# Patient Record
Sex: MALE | Race: WHITE | Employment: FULL TIME | URBAN - METROPOLITAN AREA
[De-identification: names, ages, dates, MRNs, and addresses within clinical notes are randomized per-mention and may not be internally consistent; named-entity substitution may affect disease eponyms.]

---

## 2019-08-20 ENCOUNTER — HOSPITAL ENCOUNTER (EMERGENCY)
Age: 39
Discharge: HOME | End: 2019-08-21
Attending: EMERGENCY MEDICINE
Payer: COMMERCIAL

## 2019-08-20 VITALS
HEART RATE: 86 BPM | RESPIRATION RATE: 18 BRPM | SYSTOLIC BLOOD PRESSURE: 126 MMHG | HEIGHT: 71 IN | DIASTOLIC BLOOD PRESSURE: 74 MMHG | WEIGHT: 228 LBS | BODY MASS INDEX: 31.92 KG/M2 | TEMPERATURE: 97.8 F | OXYGEN SATURATION: 100 %

## 2019-08-20 DIAGNOSIS — Z77.098 CHEMICAL EXPOSURE OF EYE: Primary | ICD-10-CM

## 2019-08-20 PROCEDURE — 99282 EMERGENCY DEPT VISIT SF MDM: CPT

## 2019-08-20 ASSESSMENT — VISUAL ACUITY
OD: 20/25
OU: 20/10
OS: 20/10

## 2020-08-21 ENCOUNTER — OFFICE VISIT (OUTPATIENT)
Dept: SURGERY | Age: 40
End: 2020-08-21
Payer: COMMERCIAL

## 2020-08-21 VITALS
HEART RATE: 77 BPM | BODY MASS INDEX: 32.34 KG/M2 | WEIGHT: 231 LBS | TEMPERATURE: 97.3 F | DIASTOLIC BLOOD PRESSURE: 75 MMHG | SYSTOLIC BLOOD PRESSURE: 118 MMHG | HEIGHT: 71 IN

## 2020-08-21 PROBLEM — K43.2 INCISIONAL HERNIA, WITHOUT OBSTRUCTION OR GANGRENE: Status: ACTIVE | Noted: 2020-08-21

## 2020-08-21 PROBLEM — K40.90 NON-RECURRENT UNILATERAL INGUINAL HERNIA WITHOUT OBSTRUCTION OR GANGRENE: Status: ACTIVE | Noted: 2020-08-21

## 2020-08-21 PROCEDURE — 99243 OFF/OP CNSLTJ NEW/EST LOW 30: CPT | Performed by: SURGERY

## 2020-08-21 RX ORDER — SODIUM CHLORIDE 0.9 % (FLUSH) 0.9 %
10 SYRINGE (ML) INJECTION PRN
Status: CANCELLED | OUTPATIENT
Start: 2020-08-21

## 2020-08-21 RX ORDER — SODIUM CHLORIDE 0.9 % (FLUSH) 0.9 %
10 SYRINGE (ML) INJECTION EVERY 12 HOURS SCHEDULED
Status: CANCELLED | OUTPATIENT
Start: 2020-08-21

## 2020-08-21 NOTE — PROGRESS NOTES
New Patient [address] [place] [address] Surgery Alfonso BHATIA [place], [address] [facility], [address] [facility], [address] [facility]  Phone: [phone]  Fax: [phone]    Ovi Kern   YOB: 1980    Date of Visit:  8/21/2020    No ref. provider found  Swetha Anderson    HPI:   Inguinal Hernia: Patient is 47y.o. year old male seen at request of Swetha Anderson. Patient presents for evaluation of right right inguinal hernia. Symptoms were first noted several years ago. Pain is dull, intermittent. Lump is reducible. Pt has no symptoms of  chronic constipation, chronic cough, difficulty urinating. Pt. does not have previous history of prior  Inguinal bilateral hernia surgery. Pt has also noted more recently a vague bulge in his upper epigastrium centered on a trocar incision from prior cholecystectomy. No pain. Slowly enlarging. No Known Allergies  No outpatient medications have been marked as taking for the 8/21/20 encounter (Office Visit) with Susan Eagle MD.       History reviewed. No pertinent past medical history. Past Surgical History:   Procedure Laterality Date    GALLBLADDER SURGERY      TONSILLECTOMY       History reviewed. No pertinent family history.   Social History     Socioeconomic History    Marital status:      Spouse name: Not on file    Number of children: Not on file    Years of education: Not on file    Highest education level: Not on file   Occupational History    Not on file   Social Needs    Financial resource strain: Not on file    Food insecurity     Worry: Not on file     Inability: Not on file    Transportation needs     Medical: Not on file     Non-medical: Not on file   Tobacco Use    Smoking status: Never Smoker    Smokeless tobacco: Never Used   Substance and Sexual Activity    Alcohol use: Yes     Comment: occ     Drug use: Never    Sexual activity: Not on file   Lifestyle    Physical activity     Days per time      DATA:      ASSESSMENT:     Diagnosis Orders   1. Non-recurrent unilateral inguinal hernia without obstruction or gangrene     2. Incisional hernia, without obstruction or gangrene           PLAN:    We will schedule the pt for  right inguinal hernia repair via a robotic approach. I will also repair the trocar site hernia - I may use the site as a trocar site for the inguinal hernia repair, and then repair the epigastric defect at the end, also with mesh. The technical aspects, risks, benefits and complications of the procedure were discussed with the patient. The pt appears to understand, asks appropriate questions, and agrees to proceed with the procedure.        EVGENY [employer] FABIAN

## 2020-08-21 NOTE — PATIENT INSTRUCTIONS
[address] [address]  Phone: [phone]  Fax: [phone] will be scheduled for surgery with Dr. Ravin Franklin. · The office will call you with the date and time that surgery is scheduled. · Please take note of these instructions for surgery:  · You should have nothing by mouth after midnight the night before your surgery - this includes no food or water. · Your surgery will be cancelled if you have taken anything by mouth after midnight, NO exceptions. · You will need to have a history and physical prior to your surgery. This will need to be completed up to 30 days before your surgery. This H/P can be completed by your family doctor or the hospital.   · IF you take coumadin (warfarin), please stop taking this medication 5 days prior to your surgery. · IF you take plavix, please stop taking this 7 days prior to your surgery. · Please contact our office if you have a pacemaker or defibrillator. · IF you are allergic to latex, please tell our office prior to your surgery. This is important in know before scheduling your surgery. · IF you are having an out patient surgery, you will need someone available to drive you home after your surgery, and to also stay with you for the rest of the day. · IF you are having a surgery requiring an inpatient stay in the hospital, you will need someone to drive you home upon discharge from the hospital.  · Please contact Dr. Dillard  Tameka Andino if you have any questions or concerns. · Please call the office with any changes in your symptoms or further questions/concerns.  [phone]

## 2020-08-25 ENCOUNTER — TELEPHONE (OUTPATIENT)
Dept: GASTROENTEROLOGY | Age: 40
End: 2020-08-25

## 2020-08-26 NOTE — TELEPHONE ENCOUNTER
Called and spoke w/ pt - Scheduled for a Robotic Right Inguinal Hernia Repair with Mesh & Epigastric Hernia Repair with Mesh, Possible Open Procedures (General Anes) w/ Dr Fallon Lopez on 9/16/20 @ 8:30am arrival 6:30am [facility] - NPO after midnight - Pt scheduled to have COVID test @ the [facility] on 9/10/20 - Salvador Cole will complete pre-op physical (H&P form given to pt @ [facility] on 8/21/20) - Surgery instructions emailed to pt: [email or web address]

## 2020-09-10 ENCOUNTER — OFFICE VISIT (OUTPATIENT)
Dept: PRIMARY CARE CLINIC | Age: 40
End: 2020-09-10
Payer: COMMERCIAL

## 2020-09-10 PROCEDURE — 99211 OFF/OP EST MAY X REQ PHY/QHP: CPT | Performed by: NURSE PRACTITIONER

## 2020-09-10 NOTE — PATIENT INSTRUCTIONS

## 2020-09-10 NOTE — PROGRESS NOTES
Ovi Kern received a viral test for COVID-19. They were educated on isolation and quarantine as appropriate. For any symptoms, they were directed to seek care from their PCP, given contact information to establish with a doctor, directed to an urgent care or the emergency room.

## 2020-09-11 NOTE — PROGRESS NOTES
Obstructive Sleep Apnea (SANDEEP) Screening     Patient: Papi Medina    YOB: 1980      Medical Record #:  [id number]                     Date:  9/11/2020     1. Are you a loud and/or regular snorer? []  Yes       [x] No    2. Have you been observed to gasp or stop breathing during sleep? []  Yes       [x] No    3. Do you feel tired or groggy upon awakening or do you awaken with a headache?           []  Yes       [] No    4. Are you often tired or fatigued during the wake time hours? []  Yes       [] No    5. Do you fall asleep sitting, reading, watching TV or driving? []  Yes       [] No    6. Do you often have problems with memory or concentration? []  Yes       [] No    **If patient's score is ? 3 they are considered high risk for SANDEEP. An Anesthesia provider will evaluate the patient and develop a plan of care the day of surgery. Note:  If the patient's BMI is more than 35 kg m¯² , has neck circumference > 40 cm, and/or high blood pressure the risk is greater (© American Sleep Apnea Association, 2006).

## 2020-09-12 LAB — SARS-COV-2, NAA: NOT DETECTED

## 2020-09-15 ENCOUNTER — ANESTHESIA EVENT (OUTPATIENT)
Dept: OPERATING ROOM | Age: 40
End: 2020-09-15
Payer: COMMERCIAL

## 2020-09-16 ENCOUNTER — ANESTHESIA (OUTPATIENT)
Dept: OPERATING ROOM | Age: 40
End: 2020-09-16
Payer: COMMERCIAL

## 2020-09-16 ENCOUNTER — HOSPITAL ENCOUNTER (OUTPATIENT)
Age: 40
Setting detail: OUTPATIENT SURGERY
Discharge: HOME | End: 2020-09-16
Attending: SURGERY | Admitting: SURGERY
Payer: COMMERCIAL

## 2020-09-16 VITALS
HEART RATE: 86 BPM | RESPIRATION RATE: 14 BRPM | TEMPERATURE: 97.2 F | DIASTOLIC BLOOD PRESSURE: 70 MMHG | OXYGEN SATURATION: 98 % | HEIGHT: 71 IN | BODY MASS INDEX: 32.2 KG/M2 | SYSTOLIC BLOOD PRESSURE: 121 MMHG | WEIGHT: 230 LBS

## 2020-09-16 VITALS — OXYGEN SATURATION: 100 % | SYSTOLIC BLOOD PRESSURE: 108 MMHG | DIASTOLIC BLOOD PRESSURE: 71 MMHG

## 2020-09-16 PROCEDURE — 3600000019 HC SURGERY ROBOT ADDTL 15MIN: Performed by: SURGERY

## 2020-09-16 PROCEDURE — 6360000002 HC RX W HCPCS: Performed by: SURGERY

## 2020-09-16 PROCEDURE — 2580000003 HC RX 258: Performed by: SURGERY

## 2020-09-16 PROCEDURE — 7100000010 HC PHASE II RECOVERY - FIRST 15 MIN: Performed by: SURGERY

## 2020-09-16 PROCEDURE — 3700000000 HC ANESTHESIA ATTENDED CARE: Performed by: SURGERY

## 2020-09-16 PROCEDURE — 2500000003 HC RX 250 WO HCPCS: Performed by: NURSE ANESTHETIST, CERTIFIED REGISTERED

## 2020-09-16 PROCEDURE — 7100000000 HC PACU RECOVERY - FIRST 15 MIN: Performed by: SURGERY

## 2020-09-16 PROCEDURE — 3700000001 HC ADD 15 MINUTES (ANESTHESIA): Performed by: SURGERY

## 2020-09-16 PROCEDURE — 2580000003 HC RX 258: Performed by: NURSE ANESTHETIST, CERTIFIED REGISTERED

## 2020-09-16 PROCEDURE — 49650 LAP ING HERNIA REPAIR INIT: CPT | Performed by: SURGERY

## 2020-09-16 PROCEDURE — 6360000002 HC RX W HCPCS: Performed by: NURSE ANESTHETIST, CERTIFIED REGISTERED

## 2020-09-16 PROCEDURE — 2580000003 HC RX 258: Performed by: ANESTHESIOLOGY

## 2020-09-16 PROCEDURE — 2500000003 HC RX 250 WO HCPCS: Performed by: SURGERY

## 2020-09-16 PROCEDURE — C1781 MESH (IMPLANTABLE): HCPCS | Performed by: SURGERY

## 2020-09-16 PROCEDURE — 3600000009 HC SURGERY ROBOT BASE: Performed by: SURGERY

## 2020-09-16 PROCEDURE — 6360000002 HC RX W HCPCS: Performed by: ANESTHESIOLOGY

## 2020-09-16 PROCEDURE — 7100000001 HC PACU RECOVERY - ADDTL 15 MIN: Performed by: SURGERY

## 2020-09-16 PROCEDURE — S2900 ROBOTIC SURGICAL SYSTEM: HCPCS | Performed by: SURGERY

## 2020-09-16 PROCEDURE — 2709999900 HC NON-CHARGEABLE SUPPLY: Performed by: SURGERY

## 2020-09-16 PROCEDURE — C9290 INJ, BUPIVACAINE LIPOSOME: HCPCS | Performed by: SURGERY

## 2020-09-16 PROCEDURE — 49655 PR LAP, INCISIONAL HERNIA REPAIR,INCARCERATED: CPT | Performed by: SURGERY

## 2020-09-16 PROCEDURE — 7100000011 HC PHASE II RECOVERY - ADDTL 15 MIN: Performed by: SURGERY

## 2020-09-16 DEVICE — MESH HERN W16XL12CM LAP MFIL POLY TEREPHTHALATE MFIL: Type: IMPLANTABLE DEVICE | Site: GROIN | Status: FUNCTIONAL

## 2020-09-16 DEVICE — MESH HERN W10XL15CM POLYPR FLAT L PORE MFIL SFT KNIT LTWT: Type: IMPLANTABLE DEVICE | Site: ABDOMEN | Status: FUNCTIONAL

## 2020-09-16 RX ORDER — SODIUM CHLORIDE, SODIUM LACTATE, POTASSIUM CHLORIDE, CALCIUM CHLORIDE 600; 310; 30; 20 MG/100ML; MG/100ML; MG/100ML; MG/100ML
INJECTION, SOLUTION INTRAVENOUS CONTINUOUS PRN
Status: DISCONTINUED | OUTPATIENT
Start: 2020-09-16 | End: 2020-09-16 | Stop reason: SDUPTHER

## 2020-09-16 RX ORDER — FENTANYL CITRATE 50 UG/ML
INJECTION, SOLUTION INTRAMUSCULAR; INTRAVENOUS PRN
Status: DISCONTINUED | OUTPATIENT
Start: 2020-09-16 | End: 2020-09-16 | Stop reason: SDUPTHER

## 2020-09-16 RX ORDER — SODIUM CHLORIDE, SODIUM LACTATE, POTASSIUM CHLORIDE, CALCIUM CHLORIDE 600; 310; 30; 20 MG/100ML; MG/100ML; MG/100ML; MG/100ML
INJECTION, SOLUTION INTRAVENOUS CONTINUOUS
Status: DISCONTINUED | OUTPATIENT
Start: 2020-09-16 | End: 2020-09-16 | Stop reason: HOSPADM

## 2020-09-16 RX ORDER — MEPERIDINE HYDROCHLORIDE 50 MG/ML
12.5 INJECTION INTRAMUSCULAR; INTRAVENOUS; SUBCUTANEOUS EVERY 5 MIN PRN
Status: DISCONTINUED | OUTPATIENT
Start: 2020-09-16 | End: 2020-09-16 | Stop reason: HOSPADM

## 2020-09-16 RX ORDER — OXYCODONE HYDROCHLORIDE AND ACETAMINOPHEN 5; 325 MG/1; MG/1
1 TABLET ORAL EVERY 4 HOURS PRN
Qty: 15 TABLET | Refills: 0 | Status: SHIPPED | OUTPATIENT
Start: 2020-09-16 | End: 2020-09-19

## 2020-09-16 RX ORDER — ONDANSETRON 2 MG/ML
INJECTION INTRAMUSCULAR; INTRAVENOUS PRN
Status: DISCONTINUED | OUTPATIENT
Start: 2020-09-16 | End: 2020-09-16 | Stop reason: SDUPTHER

## 2020-09-16 RX ORDER — OXYCODONE HYDROCHLORIDE AND ACETAMINOPHEN 5; 325 MG/1; MG/1
1 TABLET ORAL PRN
Status: DISCONTINUED | OUTPATIENT
Start: 2020-09-16 | End: 2020-09-16 | Stop reason: HOSPADM

## 2020-09-16 RX ORDER — LIDOCAINE HYDROCHLORIDE 10 MG/ML
1 INJECTION, SOLUTION EPIDURAL; INFILTRATION; INTRACAUDAL; PERINEURAL
Status: DISCONTINUED | OUTPATIENT
Start: 2020-09-16 | End: 2020-09-16 | Stop reason: HOSPADM

## 2020-09-16 RX ORDER — BUPIVACAINE HYDROCHLORIDE 5 MG/ML
INJECTION, SOLUTION EPIDURAL; INTRACAUDAL PRN
Status: DISCONTINUED | OUTPATIENT
Start: 2020-09-16 | End: 2020-09-16 | Stop reason: ALTCHOICE

## 2020-09-16 RX ORDER — LIDOCAINE HYDROCHLORIDE 20 MG/ML
INJECTION, SOLUTION INFILTRATION; PERINEURAL PRN
Status: DISCONTINUED | OUTPATIENT
Start: 2020-09-16 | End: 2020-09-16 | Stop reason: SDUPTHER

## 2020-09-16 RX ORDER — SODIUM CHLORIDE 0.9 % (FLUSH) 0.9 %
10 SYRINGE (ML) INJECTION PRN
Status: DISCONTINUED | OUTPATIENT
Start: 2020-09-16 | End: 2020-09-16 | Stop reason: HOSPADM

## 2020-09-16 RX ORDER — ONDANSETRON 2 MG/ML
4 INJECTION INTRAMUSCULAR; INTRAVENOUS EVERY 30 MIN PRN
Status: DISCONTINUED | OUTPATIENT
Start: 2020-09-16 | End: 2020-09-16 | Stop reason: HOSPADM

## 2020-09-16 RX ORDER — PHENYLEPHRINE HCL IN 0.9% NACL 1 MG/10 ML
SYRINGE (ML) INTRAVENOUS PRN
Status: DISCONTINUED | OUTPATIENT
Start: 2020-09-16 | End: 2020-09-16 | Stop reason: SDUPTHER

## 2020-09-16 RX ORDER — DEXAMETHASONE SODIUM PHOSPHATE 4 MG/ML
INJECTION, SOLUTION INTRA-ARTICULAR; INTRALESIONAL; INTRAMUSCULAR; INTRAVENOUS; SOFT TISSUE PRN
Status: DISCONTINUED | OUTPATIENT
Start: 2020-09-16 | End: 2020-09-16 | Stop reason: SDUPTHER

## 2020-09-16 RX ORDER — SODIUM CHLORIDE 0.9 % (FLUSH) 0.9 %
10 SYRINGE (ML) INJECTION EVERY 12 HOURS SCHEDULED
Status: DISCONTINUED | OUTPATIENT
Start: 2020-09-16 | End: 2020-09-16 | Stop reason: HOSPADM

## 2020-09-16 RX ORDER — PROPOFOL 10 MG/ML
INJECTION, EMULSION INTRAVENOUS PRN
Status: DISCONTINUED | OUTPATIENT
Start: 2020-09-16 | End: 2020-09-16 | Stop reason: SDUPTHER

## 2020-09-16 RX ORDER — DIPHENHYDRAMINE HYDROCHLORIDE 50 MG/ML
6.25 INJECTION INTRAMUSCULAR; INTRAVENOUS
Status: DISCONTINUED | OUTPATIENT
Start: 2020-09-16 | End: 2020-09-16 | Stop reason: HOSPADM

## 2020-09-16 RX ORDER — GLYCOPYRROLATE 0.2 MG/ML
INJECTION INTRAMUSCULAR; INTRAVENOUS PRN
Status: DISCONTINUED | OUTPATIENT
Start: 2020-09-16 | End: 2020-09-16 | Stop reason: SDUPTHER

## 2020-09-16 RX ORDER — HYDRALAZINE HYDROCHLORIDE 20 MG/ML
5 INJECTION INTRAMUSCULAR; INTRAVENOUS EVERY 30 MIN PRN
Status: DISCONTINUED | OUTPATIENT
Start: 2020-09-16 | End: 2020-09-16 | Stop reason: HOSPADM

## 2020-09-16 RX ORDER — MIDAZOLAM HYDROCHLORIDE 1 MG/ML
INJECTION INTRAMUSCULAR; INTRAVENOUS PRN
Status: DISCONTINUED | OUTPATIENT
Start: 2020-09-16 | End: 2020-09-16 | Stop reason: SDUPTHER

## 2020-09-16 RX ORDER — LABETALOL HYDROCHLORIDE 5 MG/ML
5 INJECTION, SOLUTION INTRAVENOUS
Status: DISCONTINUED | OUTPATIENT
Start: 2020-09-16 | End: 2020-09-16 | Stop reason: HOSPADM

## 2020-09-16 RX ORDER — OXYCODONE HYDROCHLORIDE AND ACETAMINOPHEN 5; 325 MG/1; MG/1
2 TABLET ORAL PRN
Status: DISCONTINUED | OUTPATIENT
Start: 2020-09-16 | End: 2020-09-16 | Stop reason: HOSPADM

## 2020-09-16 RX ORDER — ROCURONIUM BROMIDE 10 MG/ML
INJECTION, SOLUTION INTRAVENOUS PRN
Status: DISCONTINUED | OUTPATIENT
Start: 2020-09-16 | End: 2020-09-16 | Stop reason: SDUPTHER

## 2020-09-16 RX ORDER — SUCCINYLCHOLINE CHLORIDE 20 MG/ML
INJECTION INTRAMUSCULAR; INTRAVENOUS PRN
Status: DISCONTINUED | OUTPATIENT
Start: 2020-09-16 | End: 2020-09-16 | Stop reason: SDUPTHER

## 2020-09-16 RX ADMIN — MIDAZOLAM HYDROCHLORIDE 2 MG: 2 INJECTION, SOLUTION INTRAMUSCULAR; INTRAVENOUS at 08:29

## 2020-09-16 RX ADMIN — PROPOFOL 200 MG: 10 INJECTION, EMULSION INTRAVENOUS at 08:37

## 2020-09-16 RX ADMIN — FENTANYL CITRATE 50 MCG: 50 INJECTION, SOLUTION INTRAMUSCULAR; INTRAVENOUS at 11:29

## 2020-09-16 RX ADMIN — ROCURONIUM BROMIDE 10 MG: 10 SOLUTION INTRAVENOUS at 09:39

## 2020-09-16 RX ADMIN — LIDOCAINE HYDROCHLORIDE 100 MG: 20 INJECTION, SOLUTION INFILTRATION; PERINEURAL at 08:37

## 2020-09-16 RX ADMIN — HYDROMORPHONE HYDROCHLORIDE 0.5 MG: 1 INJECTION, SOLUTION INTRAMUSCULAR; INTRAVENOUS; SUBCUTANEOUS at 11:37

## 2020-09-16 RX ADMIN — SODIUM CHLORIDE, SODIUM LACTATE, POTASSIUM CHLORIDE, AND CALCIUM CHLORIDE: .6; .31; .03; .02 INJECTION, SOLUTION INTRAVENOUS at 06:57

## 2020-09-16 RX ADMIN — ONDANSETRON 4 MG: 2 INJECTION INTRAMUSCULAR; INTRAVENOUS at 08:37

## 2020-09-16 RX ADMIN — SUCCINYLCHOLINE CHLORIDE 120 MG: 20 INJECTION, SOLUTION INTRAMUSCULAR; INTRAVENOUS at 08:37

## 2020-09-16 RX ADMIN — Medication 100 MCG: at 09:07

## 2020-09-16 RX ADMIN — SUGAMMADEX 209 MG: 100 INJECTION, SOLUTION INTRAVENOUS at 11:17

## 2020-09-16 RX ADMIN — ROCURONIUM BROMIDE 45 MG: 10 SOLUTION INTRAVENOUS at 08:42

## 2020-09-16 RX ADMIN — FENTANYL CITRATE 50 MCG: 50 INJECTION, SOLUTION INTRAMUSCULAR; INTRAVENOUS at 11:16

## 2020-09-16 RX ADMIN — ROCURONIUM BROMIDE 20 MG: 10 SOLUTION INTRAVENOUS at 10:04

## 2020-09-16 RX ADMIN — FENTANYL CITRATE 150 MCG: 50 INJECTION, SOLUTION INTRAMUSCULAR; INTRAVENOUS at 08:37

## 2020-09-16 RX ADMIN — CEFAZOLIN SODIUM 2 G: 10 INJECTION, POWDER, FOR SOLUTION INTRAVENOUS at 08:43

## 2020-09-16 RX ADMIN — DEXAMETHASONE SODIUM PHOSPHATE 8 MG: 4 INJECTION, SOLUTION INTRAMUSCULAR; INTRAVENOUS at 08:37

## 2020-09-16 RX ADMIN — ROCURONIUM BROMIDE 20 MG: 10 SOLUTION INTRAVENOUS at 10:44

## 2020-09-16 RX ADMIN — GLYCOPYRROLATE 0.2 MG: 0.2 INJECTION, SOLUTION INTRAMUSCULAR; INTRAVENOUS at 09:05

## 2020-09-16 RX ADMIN — ROCURONIUM BROMIDE 5 MG: 10 SOLUTION INTRAVENOUS at 08:37

## 2020-09-16 RX ADMIN — ROCURONIUM BROMIDE 20 MG: 10 SOLUTION INTRAVENOUS at 08:55

## 2020-09-16 RX ADMIN — SODIUM CHLORIDE, SODIUM LACTATE, POTASSIUM CHLORIDE, AND CALCIUM CHLORIDE: .6; .31; .03; .02 INJECTION, SOLUTION INTRAVENOUS at 08:46

## 2020-09-16 ASSESSMENT — PULMONARY FUNCTION TESTS
PIF_VALUE: 28
PIF_VALUE: 27
PIF_VALUE: 20
PIF_VALUE: 33
PIF_VALUE: 30
PIF_VALUE: 15
PIF_VALUE: 28
PIF_VALUE: 8
PIF_VALUE: 11
PIF_VALUE: 28
PIF_VALUE: 32
PIF_VALUE: 28
PIF_VALUE: 22
PIF_VALUE: 33
PIF_VALUE: 28
PIF_VALUE: 14
PIF_VALUE: 28
PIF_VALUE: 18
PIF_VALUE: 28
PIF_VALUE: 32
PIF_VALUE: 22
PIF_VALUE: 33
PIF_VALUE: 8
PIF_VALUE: 33
PIF_VALUE: 1
PIF_VALUE: 30
PIF_VALUE: 28
PIF_VALUE: 22
PIF_VALUE: 28
PIF_VALUE: 26
PIF_VALUE: 28
PIF_VALUE: 28
PIF_VALUE: 22
PIF_VALUE: 28
PIF_VALUE: 10
PIF_VALUE: 33
PIF_VALUE: 28
PIF_VALUE: 32
PIF_VALUE: 15
PIF_VALUE: 29
PIF_VALUE: 27
PIF_VALUE: 1
PIF_VALUE: 27
PIF_VALUE: 28
PIF_VALUE: 28
PIF_VALUE: 18
PIF_VALUE: 15
PIF_VALUE: 29
PIF_VALUE: 28
PIF_VALUE: 15
PIF_VALUE: 32
PIF_VALUE: 28
PIF_VALUE: 28
PIF_VALUE: 22
PIF_VALUE: 29
PIF_VALUE: 22
PIF_VALUE: 28
PIF_VALUE: 28
PIF_VALUE: 1
PIF_VALUE: 28
PIF_VALUE: 14
PIF_VALUE: 28
PIF_VALUE: 33
PIF_VALUE: 28
PIF_VALUE: 28
PIF_VALUE: 15
PIF_VALUE: 19
PIF_VALUE: 15
PIF_VALUE: 14
PIF_VALUE: 27
PIF_VALUE: 1
PIF_VALUE: 28
PIF_VALUE: 28
PIF_VALUE: 22
PIF_VALUE: 28
PIF_VALUE: 1
PIF_VALUE: 7
PIF_VALUE: 28
PIF_VALUE: 15
PIF_VALUE: 28
PIF_VALUE: 28
PIF_VALUE: 6
PIF_VALUE: 33
PIF_VALUE: 28
PIF_VALUE: 28
PIF_VALUE: 9
PIF_VALUE: 1
PIF_VALUE: 20
PIF_VALUE: 33
PIF_VALUE: 28
PIF_VALUE: 31
PIF_VALUE: 28
PIF_VALUE: 9
PIF_VALUE: 28
PIF_VALUE: 28
PIF_VALUE: 30
PIF_VALUE: 22
PIF_VALUE: 2
PIF_VALUE: 28
PIF_VALUE: 22
PIF_VALUE: 16
PIF_VALUE: 28
PIF_VALUE: 33
PIF_VALUE: 2
PIF_VALUE: 33
PIF_VALUE: 33
PIF_VALUE: 28
PIF_VALUE: 29
PIF_VALUE: 28
PIF_VALUE: 28
PIF_VALUE: 29
PIF_VALUE: 1
PIF_VALUE: 14
PIF_VALUE: 15
PIF_VALUE: 28
PIF_VALUE: 28
PIF_VALUE: 31
PIF_VALUE: 32
PIF_VALUE: 28
PIF_VALUE: 32
PIF_VALUE: 28
PIF_VALUE: 16
PIF_VALUE: 33
PIF_VALUE: 28
PIF_VALUE: 29
PIF_VALUE: 28
PIF_VALUE: 31
PIF_VALUE: 28
PIF_VALUE: 33
PIF_VALUE: 28
PIF_VALUE: 28
PIF_VALUE: 15
PIF_VALUE: 28
PIF_VALUE: 28
PIF_VALUE: 33
PIF_VALUE: 28
PIF_VALUE: 14
PIF_VALUE: 22
PIF_VALUE: 29

## 2020-09-16 ASSESSMENT — PAIN DESCRIPTION - LOCATION
LOCATION: ABDOMEN
LOCATION: OTHER (COMMENT)

## 2020-09-16 ASSESSMENT — PAIN SCALES - GENERAL
PAINLEVEL_OUTOF10: 0
PAINLEVEL_OUTOF10: 7
PAINLEVEL_OUTOF10: 0

## 2020-09-16 ASSESSMENT — PAIN DESCRIPTION - ORIENTATION
ORIENTATION: MID;LOWER
ORIENTATION: OTHER (COMMENT)

## 2020-09-16 ASSESSMENT — PAIN DESCRIPTION - DESCRIPTORS
DESCRIPTORS: CONSTANT
DESCRIPTORS: OTHER (COMMENT)

## 2020-09-16 ASSESSMENT — PAIN DESCRIPTION - PAIN TYPE
TYPE: OTHER (COMMENT)
TYPE: SURGICAL PAIN

## 2020-09-16 ASSESSMENT — PAIN DESCRIPTION - ONSET: ONSET: OTHER (COMMENT)

## 2020-09-16 ASSESSMENT — PAIN DESCRIPTION - FREQUENCY: FREQUENCY: OTHER (COMMENT)

## 2020-09-16 NOTE — H&P
I have reviewed the history and physical and examined the patient. I find no relevant changes. I have reviewed with the patient and/or family members, during the preoperative office visit the risks, benefits, and alternatives to the procedure.     EVGENY [employer] FABIAN

## 2020-09-16 NOTE — PROGRESS NOTES
Patient able to eat, drink, ambulate and void prior to discharge. Discharge instructions given to mother of patient who expressed understanding.

## 2020-09-16 NOTE — ANESTHESIA PRE PROCEDURE
Department of Anesthesiology  Preprocedure Note       Name:  Karli Olguin   Age:  47 y.o.  :  1980                                          MRN:  [id number]         Date:  2020      Surgeon: Jeane Mejía):  Juju Currie MD    Procedure: Procedure(s):  ROBOTIC RIGHT INGUINAL HERNIA REPAIR WITH MESH AND EPIGASTRIC HERNIA REPAIR WITH MESH, POSSIBLE OPEN PROCEDURES    Medications prior to admission:   Prior to Admission medications    Not on File       Current medications:    Current Facility-Administered Medications   Medication Dose Route Frequency Provider Last Rate Last Dose    lactated ringers infusion   Intravenous Continuous Heydi Chapman MD        sodium chloride flush 0.9 % injection 10 mL  10 mL Intravenous 2 times per day Heydi Chapman MD        sodium chloride flush 0.9 % injection 10 mL  10 mL Intravenous PRN Heydi Chapman MD        lidocaine PF 1 % injection 1 mL  1 mL Intradermal Once PRN Heydi Chapman MD        ceFAZolin (ANCEF) 2 g in dextrose 5 % 100 mL IVPB  2 g Intravenous On Call to [address] [place] [address] MD        sodium chloride flush 0.9 % injection 10 mL  10 mL Intravenous 2 times per day Juju Currie MD        sodium chloride flush 0.9 % injection 10 mL  10 mL Intravenous PRN Juju Currie MD        HYDROmorphone (DILAUDID) injection 0.5 mg  0.5 mg Intravenous Q10 Min PRN Marisela Funk MD        HYDROmorphone (DILAUDID) injection 0.5 mg  0.5 mg Intravenous Q5 Min PRN Marisela Funk MD        oxyCODONE-acetaminophen (PERCOCET) 5-325 MG per tablet 1 tablet  1 tablet Oral PRN Marisela Funk MD        Or    oxyCODONE-acetaminophen (PERCOCET) 5-325 MG per tablet 2 tablet  2 tablet Oral PRN Marisela Funk MD        diphenhydrAMINE (BENADRYL) injection 6.25 mg  6.25 mg Intravenous Once PRN Marisela Funk MD        ondansetron [facility]) injection 4 mg  4 mg Intravenous Q30 Min PRN Marisela Funk MD  labetalol (NORMODYNE;TRANDATE) injection 5 mg  5 mg Intravenous Q15 Min PRN Freida Foy MD        hydrALAZINE (APRESOLINE) injection 5 mg  5 mg Intravenous Q30 Min PRN Freida Foy MD        meperidine (DEMEROL) injection 12.5 mg  12.5 mg Intravenous Q5 Min PRN Freida Foy MD           Allergies:  No Known Allergies    Problem List:    Patient Active Problem List   Diagnosis Code    Incisional hernia, without obstruction or gangrene K43.2    Non-recurrent unilateral inguinal hernia without obstruction or gangrene K40.90       Past Medical History:  History reviewed. No pertinent past medical history. Past Surgical History:        Procedure Laterality Date    CHOLECYSTECTOMY      GALLBLADDER SURGERY      TONSILLECTOMY         Social History:    Social History     Tobacco Use    Smoking status: Never Smoker    Smokeless tobacco: Never Used   Substance Use Topics    Alcohol use: Yes     Comment: 1-2 month                                Counseling given: Not Answered      Vital Signs (Current):   Vitals:    09/11/20 1424 09/16/20 0645   BP:  (!) 136/92   Pulse:  80   Resp:  16   Temp:  97.6 °F (36.4 °C)   TempSrc:  Temporal   Weight: 230 lb (104.3 kg) 230 lb (104.3 kg)   Height: 5' 11\" (1.803 m) 5' 11\" (1.803 m)                                              BP Readings from Last 3 Encounters:   09/16/20 (!) 136/92   08/21/20 118/75   08/20/19 126/74       NPO Status: Time of last liquid consumption: 2200                        Time of last solid consumption: 1830                        Date of last liquid consumption: 09/15/20                        Date of last solid food consumption: 09/15/20    BMI:   Wt Readings from Last 3 Encounters:   09/16/20 230 lb (104.3 kg)   08/21/20 231 lb (104.8 kg)   08/20/19 228 lb (103.4 kg)     Body mass index is 32.08 kg/m².     CBC: No results found for: WBC, RBC, HGB, HCT, MCV, RDW, PLT    CMP: No results found for: NA, K, CL, CO2, BUN, CREATININE, GFRAA, AGRATIO, LABGLOM, GLUCOSE, PROT, CALCIUM, BILITOT, ALKPHOS, AST, ALT    POC Tests: No results for input(s): POCGLU, POCNA, POCK, POCCL, POCBUN, POCHEMO, POCHCT in the last 72 hours. Coags: No results found for: PROTIME, INR, APTT    HCG (If Applicable): No results found for: PREGTESTUR, PREGSERUM, HCG, HCGQUANT     ABGs: No results found for: PHART, PO2ART, GUD5HJK, WFR7GWY, BEART, W0GANEVP     Type & Screen (If Applicable):  No results found for: LABABO, LABRH    Drug/Infectious Status (If Applicable):  No results found for: HIV, HEPCAB    COVID-19 Screening (If Applicable):   Lab Results   Component Value Date    COVID19 NOT DETECTED 09/10/2020         Anesthesia Evaluation  Patient summary reviewed and Nursing notes reviewed no history of anesthetic complications:   Airway: Mallampati: III     Neck ROM: full   Dental:          Pulmonary:                              Cardiovascular:                      Neuro/Psych:               GI/Hepatic/Renal:            ROS comment: obesity. Endo/Other:                     Abdominal:           Vascular:                                        Anesthesia Plan      general     ASA 2       Induction: intravenous. Anesthetic plan and risks discussed with patient. Plan discussed with CRNA.                   Yola Lobo MD   9/16/2020

## 2020-09-16 NOTE — ANESTHESIA POSTPROCEDURE EVALUATION
Department of Anesthesiology  Postprocedure Note    Patient: Teresita Gonzalez  MRN: [id number]  YOB: 1980  Date of evaluation: 9/16/2020  Time:  2:48 PM     Procedure Summary     Date:  09/16/20 Room / Location:  [address] [place] [address]    Anesthesia Start:  0830 Anesthesia Stop:  1926    Procedure:  ROBOTIC RIGHT INGUINAL HERNIA REPAIR WITH MESH AND EPIGASTRIC HERNIA REPAIR WITH MESH (N/A Abdomen) Diagnosis:       Non-recurrent unilateral inguinal hernia without obstruction or gangrene      Incisional hernia, without obstruction or gangrene      (INCISIONAL HERNIA WITHOUT OBSTRUCTION OR GANGRENE AND NON RECURRENT UNILATERAL INGUINAL HERNIA WITHOUT OBSTRUCTION OR GANGRENE)    Surgeon:  Nola Hyatt MD Responsible Provider:  Yeny Bryan MD    Anesthesia Type:  general ASA Status:  2          Anesthesia Type: general    Renny Phase I: Renny Score: 10    Renny Phase II: Renny Score: 10    Last vitals: Reviewed and per EMR flowsheets.        Anesthesia Post Evaluation    Comments: Postoperative Anesthesia Note    Name:    Teresita Gonzalez  MRN:      [id number]    Patient Vitals in the past 12 hrs:  09/16/20 1325, Temp:97.2 °F (36.2 °C)  09/16/20 1231, BP:121/70, Temp:99.6 °F (37.6 °C), Temp src:Temporal, Pulse:86, Resp:14, SpO2:98 %  09/16/20 1215, BP:119/72, Pulse:95, SpO2:97 %  09/16/20 1145, BP:125/76, Pulse:82, SpO2:95 %  09/16/20 1140, BP:118/74, Pulse:86, SpO2:96 %  09/16/20 1135, BP:130/84, Pulse:81, SpO2:95 %  09/16/20 1128, BP:(!) 140/86, Temp:97.6 °F (36.4 °C), Pulse:90, SpO2:97 %  09/16/20 0645, BP:(!) 136/92, Temp:97.6 °F (36.4 °C), Temp src:Temporal, Pulse:80, Resp:16, Height:5' 11\" (1.803 m), Weight:230 lb (104.3 kg)     LABS:    CBC  No results found for: WBC, HGB, HCT, PLT  RENAL  No results found for: NA, K, CL, CO2, BUN, CREATININE, GLUCOSE  COAGS  No results found for: PROTIME, INR, APTT    Intake & Output:  @14GUAK@    Nausea & Vomiting:  No    Level of Consciousness:  Awake    Pain Assessment:  Adequate analgesia    Anesthesia Complications:  No apparent anesthetic complications    SUMMARY      Vital signs stable  OK to discharge from Stage I post anesthesia care.   Care transferred from Anesthesiology department on discharge from perioperative area

## 2020-09-17 ENCOUNTER — TELEPHONE (OUTPATIENT)
Dept: SURGERY | Age: 40
End: 2020-09-17

## 2020-09-17 NOTE — TELEPHONE ENCOUNTER
Called and spoke w/ pt - explained if pain gets really bad he can take 2 Percocets - pt asked if he can alternate w/ Ibuprofen - I said he could take up to four 200mg Ibuprofens q 6 hours PRN then 1-2 Percocets for next couple of days then pain should lessen by then - pt understood and agreed

## 2020-09-17 NOTE — OP NOTE
scope  was passed under direct vision into the peritoneal cavity. Insufflation  was initiated. I used his prior right-sided trocar incision to place a  second trocar and then his infraumbilical trocar incision was also  reopened to place a 12-mm trocar. Examining the abdomen, we could see a  small right inguinal hernia defect and no evidence of a left inguinal  hernia. I could appreciate the site of the epigastric trocar fascial  defect underneath the falciform ligament. The bed was positioned to  expose the pelvis initially. We began by opening the peritoneal flap on the right side in a standard  fashion above the inguinal region. We then dissected down in the  preperitoneal plane across the inguinal region first heading medially  down to the pubic ramus and pubic tubercle. I dissected across midline  to the left side slightly. I dissected below the ramus into the space  of Retzius. There was no evidence of direct or more hernias noted. A  lateral iliopubic tract region was dissected down onto the psoas muscle. The spermatic cord region was now dissected free. The peritoneal small  sac was easily dissected off of the cord structures back down to the  level of the psoas muscles. There was a moderate-sized cord lipoma  extending out through a mildly dilated internal inguinal ring. The cord  lipoma was easily mobilized out of the canal, and excised and removed. We now had the right inguinal region adequately dissected, a 12 x 16 cm  extra large ProGrip mesh was folded and inserted into the preperitoneal  space. The mesh was then unfolded against the inguinal structures. It  was fixated in nice position covering the entire region appropriately. The peritoneal flap was then closed with a running 3-0 barbed suture. Prior to closing the flap fully, an angiocatheter was placed by the  assistant to the midline lower abdominal wall into the inguinal space.    After the flap was fully closed, we then infused approximately 20 mL of  long-acting local anesthetic and then allowed the flap to vent out the  Angiocath to help fixate onto the mesh. We now located the balloon with a robot to look to the upper abdomen. I  then opened the peritoneum just above the umbilicus at the lower edge of  the falciform ligament in a transverse direction. We dissected down the  falciform ligament and dissected fat off of the linea alba. We then  dissected in a superior direction up to the linea alba until we  encountered the fascial defect. The fascial defect was small with a  small amount of incarcerated preperitoneal fat. The fat was easily  reduced out of the defect and continued dissecting above the defect. We  cleared off a nice wide space around the defect, which I ultimately  measured and approximately 8 x 12 cm. The defect itself was measured  approximately 1 x 1.5 cm in a transverse orientation. A 0-barbed suture  was used to close the fascial defect in a running fashion. I then  opened and trimmed a soft Prolene mesh down to 8 x 12 cm. The mesh was  inserted in position into the preperitoneal space. I then secured the  mesh to the overlying linea alba with Vicryl sutures at four points. Finally, the peritoneal flap was closed to seal the mesh nicely with a  running 3-0 barbed suture. At this point, I was satisfied with both  hernia repairs. The robot was undocked and the trocars were all  removed. The 12-mm fascial defect was closed with a figure-of-eight 0  Vicryl suture. The skin incisions were closed with 4-0 Monocryl  subcuticular sutures and Dermabond. The patient was then extubated and  taken to the recovery room in stable condition.         Vicki Whitt MD    D: 09/16/2020 20:53:01       T: 09/17/2020 1:13:37     [id number]  Job#: [id number]     Doc#: [id number]    CC:  Tomer Crenshaw

## 2020-10-02 ENCOUNTER — OFFICE VISIT (OUTPATIENT)
Dept: SURGERY | Age: 40
End: 2020-10-02

## 2020-10-02 VITALS
DIASTOLIC BLOOD PRESSURE: 85 MMHG | SYSTOLIC BLOOD PRESSURE: 116 MMHG | BODY MASS INDEX: 32.26 KG/M2 | HEART RATE: 74 BPM | TEMPERATURE: 97.8 F | WEIGHT: 230.4 LBS | HEIGHT: 71 IN

## 2020-10-02 PROCEDURE — 99024 POSTOP FOLLOW-UP VISIT: CPT | Performed by: SURGERY

## 2021-04-01 ENCOUNTER — IMMUNIZATION (OUTPATIENT)
Dept: PRIMARY CARE CLINIC | Age: 41
End: 2021-04-01
Payer: COMMERCIAL

## 2021-04-01 PROCEDURE — 91301 COVID-19, MODERNA VACCINE 100MCG/0.5ML DOSE: CPT | Performed by: FAMILY MEDICINE

## 2021-04-01 PROCEDURE — 0011A COVID-19, MODERNA VACCINE 100MCG/0.5ML DOSE: CPT | Performed by: FAMILY MEDICINE

## 2021-04-17 NOTE — PROGRESS NOTES
consider vestibular evaluation to further investigate symptoms of dizziness.        Dahlia Bae  Audiologist    Chart CC'd to: Rip Medeiros MD      Degree of   Hearing Sensitivity dB Range   Within Normal Limits (WNL) 0 - 20   Mild 20 - 40   Moderate 40 - 55   Moderately-Severe 55 - 70   Severe 70 - 90   Profound 90 +

## 2021-04-20 ENCOUNTER — PROCEDURE VISIT (OUTPATIENT)
Dept: AUDIOLOGY | Age: 41
End: 2021-04-20
Payer: COMMERCIAL

## 2021-04-20 ENCOUNTER — TRANSFERRED RECORDS (OUTPATIENT)
Dept: HEALTH INFORMATION MANAGEMENT | Facility: CLINIC | Age: 41
End: 2021-04-20

## 2021-04-20 ENCOUNTER — OFFICE VISIT (OUTPATIENT)
Dept: ENT CLINIC | Age: 41
End: 2021-04-20
Payer: COMMERCIAL

## 2021-04-20 ENCOUNTER — TELEPHONE (OUTPATIENT)
Dept: ENT CLINIC | Age: 41
End: 2021-04-20

## 2021-04-20 VITALS
WEIGHT: 246 LBS | SYSTOLIC BLOOD PRESSURE: 118 MMHG | BODY MASS INDEX: 34.44 KG/M2 | DIASTOLIC BLOOD PRESSURE: 80 MMHG | TEMPERATURE: 99.1 F | HEART RATE: 83 BPM | HEIGHT: 71 IN

## 2021-04-20 DIAGNOSIS — H90.41 SENSORINEURAL HEARING LOSS (SNHL) OF RIGHT EAR WITH UNRESTRICTED HEARING OF LEFT EAR: ICD-10-CM

## 2021-04-20 DIAGNOSIS — R42 DIZZINESS: ICD-10-CM

## 2021-04-20 DIAGNOSIS — R42 VERTIGO: ICD-10-CM

## 2021-04-20 DIAGNOSIS — H93.11 TINNITUS, RIGHT EAR: ICD-10-CM

## 2021-04-20 DIAGNOSIS — H90.41 SENSORINEURAL HEARING LOSS (SNHL) OF RIGHT EAR WITH UNRESTRICTED HEARING OF LEFT EAR: Primary | ICD-10-CM

## 2021-04-20 DIAGNOSIS — H93.19 TINNITUS, UNSPECIFIED LATERALITY: Primary | ICD-10-CM

## 2021-04-20 PROCEDURE — 92557 COMPREHENSIVE HEARING TEST: CPT | Performed by: AUDIOLOGIST

## 2021-04-20 PROCEDURE — 92550 TYMPANOMETRY & REFLEX THRESH: CPT | Performed by: AUDIOLOGIST

## 2021-04-20 PROCEDURE — 99203 OFFICE O/P NEW LOW 30 MIN: CPT | Performed by: OTOLARYNGOLOGY

## 2021-04-20 RX ORDER — PREDNISONE 20 MG/1
TABLET ORAL
Qty: 36 TABLET | Refills: 0 | Status: SHIPPED | OUTPATIENT
Start: 2021-04-20 | End: 2021-05-07

## 2021-04-20 NOTE — PROGRESS NOTES
[address] [facility] SURGERY  NEW PATIENT HISTORY AND PHYSICAL NOTE      Patient Name: Noah Watkins  Medical Record Number:  [id number]  Primary Care Physician:  Tiana Walker    ChiefComplaint     Chief Complaint   Patient presents with    Tinnitus     States has had ear issues for past 20 years, has recently gotten worse. States tinnitus is now louder       History of Present Illness     Noah Watkins is an 46 y.o. male with complaint of right ear pulsatile tinnitus, sudden hearing loss and vertigo. He states he was diagnosed with Ménière's disease at age 25 (by Dr. Gay Dykes), with a single episode of vertigo and right ear hearing loss. He had one further episode of vertigo lasting several hours while in college, but none for the intervening 10 years. Over the past 2 weeks, however, he has had two episodes of randi vertigo and nausea lasting 2-3 hours at a time, which she believes were triggered by taking Benadryl in the morning. With episodes he denies hearing loss, roaring tinnitus however states photophobia and slight headache; no aura. He has also had constant tinnitus in the right ear since adolescence, with no pulsatile quality initially however approximately 12/2020 he had an episode of sudden hearing loss in the right ear with a change in his tinnitusis now pulsatile. Believes he can triggered by touching his facedenies any change in tinnitus with neck compression or changes in head/body position. Prior to this, he states his hearing was fluctuant in nature. No otalgia, otorrhea, ear fullness, chronic middle ear disease or ear surgery. Has been on a low-salt diet, good hydration, diuretics in adolescence but none since. 1 cup of coffee daily, no tobacco use    Past Medical History     History reviewed. No pertinent past medical history.     Past Surgical History     Past Surgical History:   Procedure Laterality Date    CHOLECYSTECTOMY      GALLBLADDER SURGERY  HERNIA REPAIR N/A 9/16/2020    ROBOTIC RIGHT INGUINAL HERNIA REPAIR WITH MESH AND EPIGASTRIC HERNIA REPAIR WITH MESH performed by Campbell Saenz MD at [address] [facility]         Family History     History reviewed. No pertinent family history. Social History     Social History     Tobacco Use    Smoking status: Never Smoker    Smokeless tobacco: Never Used   Substance Use Topics    Alcohol use: Yes     Comment: 1-2 month    Drug use: Never        Allergies     No Known Allergies    Medications     Current Outpatient Medications   Medication Sig Dispense Refill    Fexofenadine-Pseudoephedrine (ALLEGRA-D PO) Take by mouth      predniSONE (DELTASONE) 20 MG tablet Take 3 tablets by mouth daily for 7 days, THEN 2.5 tablets daily for 2 days, THEN 2 tablets daily for 2 days, THEN 1.5 tablets daily for 2 days, THEN 1 tablet daily for 2 days, THEN 0.5 tablets daily for 2 days. 36 tablet 0     No current facility-administered medications for this visit.         Review of Systems     REVIEW OF SYSTEMS  The following systems were reviewed and revealed the following in addition to any already discussed in the HPI:    CONSTITUTIONAL: No weight loss, no fever, no night sweats, no chills  EYES: no vision changes, no blurry vision  EARS: + Changes in hearing, no otalgia  NOSE: no epistaxis, no rhinorrhea  RESPIRATORY: No difficulty breathing, no shortness of breath  CV: no chest pain, no peripheral vascular disease  HEME: No coagulation disorder, no bleeding disorder  NEURO: No TIA or stroke-like symptoms  SKIN: No new rashes in the head and neck, no recent skin cancers  MOUTH: No new ulcers, no recent teeth infections  GASTROINTESTINAL: No diarrhea, stomach pain  PSYCH: No anxiety, no depression    PhysicalExam     Vitals:    04/20/21 0842   BP: 118/80   Site: Left Upper Arm   Position: Sitting   Cuff Size: Large Adult   Pulse: 83   Temp: 99.1 °F (37.3 °C)   TempSrc: Infrared   Weight: 246 lb (111.6 kg) Height: 5' 11\" (1.803 m)       PHYSICAL EXAM  /80 (Site: Left Upper Arm, Position: Sitting, Cuff Size: Large Adult)   Pulse 83   Temp 99.1 °F (37.3 °C) (Infrared)   Ht 5' 11\" (1.803 m)   Wt 246 lb (111.6 kg)   BMI 34.31 kg/m²     GENERAL: No Acute Distress, Alert and Oriented, no hoarseness  EYES: EOMI, Anti-icteric  NOSE: On anterior rhinoscopy there is no epistaxis, nasal mucosa within normal limits, no purulent drainage  EARS: Normal external appearance; pulsatile tinnitus is not auscultated over the mastoid on portable otomicroscopy:  -Right ear: External auditory canal without stenosis, tympanic membrane clear, no middle ear effusions or retractions  -Left ear: External auditory canal without stenosis, tympanic membrane clear, no middle ear effusions or retractions  -Tuning fork testing: With a 512-Hz tuning fork the Fernandez is midline, The Rinne on the right ear the is air>bone conduction, on the left ear air>bone conduction  FACE: 1/6 House-Brackmann Scale, symmetric, sensation equal bilaterally  ORAL CAVITY: No masses or lesions palpated, uvula is midline, moist mucous membranes, 0+ tonsils, good dentition  NECK: Normal range of motion, no thyromegaly, trachea is midline, no lymphadenopathy, no neck masses, no crepitus; tension pulsatile tinnitus with right or left head turn or compression of the right carotid triangle; no palpable thrills or auscultable bruits  CHEST: Normal respiratory effort, no retractions, breathing comfortably  SKIN: No rashes, normal appearing skin, no evidence of skin lesions/tumors  NEURO: CN 2, 3, 4, 5, 6, 7, 11, 12 intact bilaterally   -Negative head thrust test bilaterally (no corrective saccade noted)    Data/Imaging Review            Procedure     None      Assessment and Plan     1.  Tinnitus, unspecified laterality  Patient with constant tinnitus since adolescence, most recently has changed to pulsatile quality 12/2020 after an episode of sudden hearing loss in the right ear. We do not appreciate objective tinnitus on examination, nor does he have any middle ear findings suggestive of glomus tumor. Will order MRI IAC to rule out a CPA lesion.  - Jaylene Anthony [place]. D., Audiology, [facility]  - MRI IAC POSTERIOR FOSSA W WO CONTRAST; Future  - CREATININE, SERUM; Future    2. Sensorineural hearing loss (SNHL) of right ear with unrestricted hearing of left ear  Patient with moderatesevere sensorineural hearing loss of the right ear, in the past has been fluctuant in nature, sudden hearing loss 12/2020. We'll treat him with high-dose steroids for a week followed by taper to see if hearing a steroid responsive; unknown if this represents Ménière's disease, mass-effect from an expansile lesion, inflammatory/autoimmune pathology. - predniSONE (DELTASONE) 20 MG tablet; Take 3 tablets by mouth daily for 7 days, THEN 2.5 tablets daily for 2 days, THEN 2 tablets daily for 2 days, THEN 1.5 tablets daily for 2 days, THEN 1 tablet daily for 2 days, THEN 0.5 tablets daily for 2 days. Dispense: 36 tablet; Refill: 0    3. Vertigo  Varinder vertigo lasting 2-3 hours, twice in the past 2 weeks, believes it was triggered by Benadryl. He has been previously diagnosed with Ménière's disease however did not have an episode of vertigo for approximately 10 years. We do not appreciate corrective saccade with head thrust testing, we have restarted him on low-salt diet, good hydration; will consider diuretic if symptoms worsen. Return in about 1 month (around 5/20/2021). Geetha Arias MD  [facility]  Department of Otolaryngology/Head and Neck Surgery  4/20/21    I have performed a head and neck physical exam personally or was physically present during the key or critical portions of the service. Medical Decision Making:   The following items were considered in medical decision making:  Independent review of images  Review / order clinical lab tests  Review / order

## 2021-04-20 NOTE — Clinical Note
Dr. Arthur Encarnacion,    Thank you for your referral for audiometric testing on this patient. Please see the scanned audiogram (under \"Media\" tab) and encounter note for details. If you have any questions, or if there is anything else you need, please let me know.       Dahlia Clifford  Audiologist  ---  [address]  -[facility] - Audiology

## 2021-04-20 NOTE — TELEPHONE ENCOUNTER
Patient called wanting his MRI order sent to [address] [place] [address]. He provided me with the fax number ([phone]). I assured him that the order will be faxed over. Patient asked if his medication (Prednisone) would interact with his second COVID-19 vaccine. I told him I would leave a message with Dr. Mila Almanza and someone would get back with him.

## 2021-04-28 ENCOUNTER — TRANSFERRED RECORDS (OUTPATIENT)
Dept: HEALTH INFORMATION MANAGEMENT | Facility: CLINIC | Age: 41
End: 2021-04-28

## 2021-04-29 ENCOUNTER — IMMUNIZATION (OUTPATIENT)
Dept: PRIMARY CARE CLINIC | Age: 41
End: 2021-04-29
Payer: COMMERCIAL

## 2021-04-29 PROCEDURE — 0012A COVID-19, MODERNA VACCINE 100MCG/0.5ML DOSE: CPT | Performed by: FAMILY MEDICINE

## 2021-04-29 PROCEDURE — 91301 COVID-19, MODERNA VACCINE 100MCG/0.5ML DOSE: CPT | Performed by: FAMILY MEDICINE

## 2021-05-18 ENCOUNTER — OFFICE VISIT (OUTPATIENT)
Dept: ENT CLINIC | Age: 41
End: 2021-05-18
Payer: COMMERCIAL

## 2021-05-18 VITALS — HEIGHT: 71 IN | BODY MASS INDEX: 33.99 KG/M2 | TEMPERATURE: 97.8 F | WEIGHT: 242.8 LBS

## 2021-05-18 DIAGNOSIS — R11.0 NAUSEA: ICD-10-CM

## 2021-05-18 DIAGNOSIS — H93.11 TINNITUS OF RIGHT EAR: ICD-10-CM

## 2021-05-18 DIAGNOSIS — R42 VERTIGO: ICD-10-CM

## 2021-05-18 DIAGNOSIS — R09.81 NASAL CONGESTION: Primary | ICD-10-CM

## 2021-05-18 PROCEDURE — 99213 OFFICE O/P EST LOW 20 MIN: CPT | Performed by: OTOLARYNGOLOGY

## 2021-05-18 RX ORDER — FLUTICASONE PROPIONATE 50 MCG
1 SPRAY, SUSPENSION (ML) NASAL 2 TIMES DAILY
Qty: 1 BOTTLE | Refills: 2 | Status: SHIPPED | OUTPATIENT
Start: 2021-05-18 | End: 2021-08-17

## 2021-05-18 RX ORDER — PREDNISONE 20 MG/1
20 TABLET ORAL 2 TIMES DAILY
Qty: 10 TABLET | Refills: 0 | Status: SHIPPED | OUTPATIENT
Start: 2021-05-18 | End: 2021-05-23

## 2021-05-18 RX ORDER — ONDANSETRON 4 MG/1
4 TABLET, FILM COATED ORAL 3 TIMES DAILY PRN
Qty: 15 TABLET | Refills: 0 | Status: SHIPPED | OUTPATIENT
Start: 2021-05-18

## 2021-05-18 RX ORDER — MECLIZINE HYDROCHLORIDE 25 MG/1
25 TABLET ORAL 3 TIMES DAILY PRN
Qty: 30 TABLET | Refills: 0 | Status: SHIPPED | OUTPATIENT
Start: 2021-05-18 | End: 2021-06-03

## 2021-05-18 RX ORDER — TRIAMTERENE AND HYDROCHLOROTHIAZIDE 37.5; 25 MG/1; MG/1
1 CAPSULE ORAL DAILY
Qty: 30 CAPSULE | Refills: 3 | Status: SHIPPED | OUTPATIENT
Start: 2021-05-18 | End: 2021-06-22 | Stop reason: ALTCHOICE

## 2021-05-18 NOTE — PATIENT INSTRUCTIONS
-Start low salt diet (<1500mg daily)  -Increase hydration (1.5-2L daily)  -Start dyazide daily  -If vertigo manifests, take meclizine, and either ibuprofin 600mg, Tylenol 500mg or excedrin migraine  -May take zofran for nausea

## 2021-05-18 NOTE — PROGRESS NOTES
tinnitus has worsened during episodes or if he has worse hearing loss; occasionally has an aura/feels that her vertigo is going to occur or that he has imbalance -for no scotomas. Vertigo is worse when he closes his eyes. Drinks 64 ounces of water daily. Past Medical History     History reviewed. No pertinent past medical history. Past Surgical History     Past Surgical History:   Procedure Laterality Date    CHOLECYSTECTOMY      GALLBLADDER SURGERY      HERNIA REPAIR N/A 9/16/2020    ROBOTIC RIGHT INGUINAL HERNIA REPAIR WITH MESH AND EPIGASTRIC HERNIA REPAIR WITH MESH performed by Fawn Centeno MD at [place] [address].         Family History     History reviewed. No pertinent family history. Social History     Social History     Tobacco Use    Smoking status: Never Smoker    Smokeless tobacco: Never Used   Vaping Use    Vaping Use: Never used   Substance Use Topics    Alcohol use: Yes     Comment: 1-2 month    Drug use: Never        Allergies     No Known Allergies    Medications     Current Outpatient Medications   Medication Sig Dispense Refill    ondansetron (ZOFRAN) 4 MG tablet Take 1 tablet by mouth 3 times daily as needed for Nausea or Vomiting 15 tablet 0    meclizine (ANTIVERT) 25 MG tablet Take 1 tablet by mouth 3 times daily as needed for Dizziness 30 tablet 0    fluticasone (FLONASE) 50 MCG/ACT nasal spray 1 spray by Each Nostril route 2 times daily 1 Bottle 2    triamterene-hydroCHLOROthiazide (DYAZIDE) 37.5-25 MG per capsule Take 1 capsule by mouth daily 30 capsule 3    predniSONE (DELTASONE) 20 MG tablet Take 1 tablet by mouth 2 times daily for 5 days 10 tablet 0    Fexofenadine-Pseudoephedrine (ALLEGRA-D PO) Take by mouth (Patient not taking: Reported on 5/18/2021)       No current facility-administered medications for this visit.        Review of Systems     REVIEW OF SYSTEMS  The following systems were reviewed and revealed the following in addition to any already discussed in the HPI:    CONSTITUTIONAL: No weight loss, no fever, no night sweats, no chills  EYES: no vision changes, no blurry vision  EARS: + Changes in hearing, no otalgia  NOSE: no epistaxis, no rhinorrhea  RESPIRATORY: No difficulty breathing, no shortness of breath  CV: no chest pain, no peripheral vascular disease  HEME: No coagulation disorder, no bleeding disorder  NEURO: No TIA or stroke-like symptoms  SKIN: No new rashes in the head and neck, no recent skin cancers  MOUTH: No new ulcers, no recent teeth infections  GASTROINTESTINAL: No diarrhea, stomach pain  PSYCH: No anxiety, no depression    PhysicalExam     Vitals:    05/18/21 1451   Temp: 97.8 °F (36.6 °C)   TempSrc: Oral   Weight: 242 lb 12.8 oz (110.1 kg)   Height: 5' 11\" (1.803 m)       PHYSICAL EXAM  Temp 97.8 °F (36.6 °C) (Oral)   Ht 5' 11\" (1.803 m)   Wt 242 lb 12.8 oz (110.1 kg)   BMI 33.86 kg/m²     GENERAL: No Acute Distress, Alert and Oriented, no hoarseness  EYES: EOMI, Anti-icteric  NOSE: On anterior rhinoscopy there is no epistaxis, nasal mucosa within normal limits, purulence noted in the right nasal passage  EARS: Normal external appearance; pulsatile tinnitus is not auscultated over the mastoid on portable otomicroscopy:  -Right ear: External auditory canal without stenosis, tympanic membrane clear, no middle ear effusions or retractions  -Left ear: External auditory canal without stenosis, tympanic membrane clear, no middle ear effusions or retractions  -Tuning fork testing: With a 512-Hz tuning fork the Fernandez is midline, The Rinne on the right ear the is air>bone conduction, on the left ear air>bone conduction  FACE: 1/6 House-Brackmann Scale, symmetric, sensation equal bilaterally  ORAL CAVITY: No masses or lesions palpated, uvula is midline, moist mucous membranes, 0+ tonsils, good dentition  NECK: Normal range of motion, no thyromegaly, trachea is midline, no lymphadenopathy, no neck masses, no crepitus; no objective pulsatile tinnitus with right or left head turn or compression of the right carotid triangle; no palpable thrills or auscultable bruits  CHEST: Normal respiratory effort, no retractions, breathing comfortably  SKIN: No rashes, normal appearing skin, no evidence of skin lesions/tumors  NEURO: CN 2, 3, 4, 5, 6, 7, 11, 12 intact bilaterally   -Mahesh Hallpike, lateral supine roll tests negative bilaterally    Procedure     None    Assessment and Plan     1. Nasal congestion  Patient with significant nasal congestion, purulence noted in the right nasal passages; sent for culture, will start him on Flonase twice daily, antibiotics if cultures grew out bacteria  - fluticasone (FLONASE) 50 MCG/ACT nasal spray; 1 spray by Each Nostril route 2 times daily  Dispense: 1 Bottle; Refill: 2  - Culture, Aerobic and Anaerobic    2. Vertigo  Patient with vertigo lasting minuteshours, appears to be worse during seasonal changes however is also accompanied by aura and/or headaches. He has poor hearing in the right ear. Is uncertain whether these represent Ménière's disease versus vestibular migraine; for now, given tinnitus in the right ear, we will treat empirically for Ménière's disease with Dyazide, low-salt diet, increased hydration. We will start him on a prednisone burst and prescribed meclizine and ondansetron for nausea. He will follow-up in 6 weeks. - meclizine (ANTIVERT) 25 MG tablet; Take 1 tablet by mouth 3 times daily as needed for Dizziness  Dispense: 30 tablet; Refill: 0  - triamterene-hydroCHLOROthiazide (DYAZIDE) 37.5-25 MG per capsule; Take 1 capsule by mouth daily  Dispense: 30 capsule; Refill: 3  - BASIC METABOLIC PANEL; Future  - Jamee - Jaylene Koehler Au.D., VNG Testing, [facility]  - predniSONE (DELTASONE) 20 MG tablet; Take 1 tablet by mouth 2 times daily for 5 days  Dispense: 10 tablet; Refill: 0    3.  Tinnitus of right ear  Tinnitus of right ear is ongoing, uncertain if this is worsened during episodes of vertigo    4. Nausea  During episodes of vertigo; will prescribe Zofran  - ondansetron (ZOFRAN) 4 MG tablet; Take 1 tablet by mouth 3 times daily as needed for Nausea or Vomiting  Dispense: 15 tablet; Refill: 0    Return in about 6 weeks (around 6/29/2021). Josefina Kerr MD  [facility]  Department of Otolaryngology/Head and Neck Surgery  5/18/21    I have performed a head and neck physical exam personally or was physically present during the key or critical portions of the service. Medical Decision Making: The following items were considered in medical decision making:  Independent review of images  Review / order clinical lab tests  Review / order radiology tests  Decision to obtain old records  Review and summation of old records as accessed through Dee (a summary of my findings in these old records: none)     Portions of this note were dictated using Dragon.  There may be linguistic errors secondary to the use of this program.

## 2021-05-23 LAB
ANAEROBIC CULTURE: ABNORMAL
GRAM STAIN RESULT: ABNORMAL
ORGANISM: ABNORMAL
WOUND/ABSCESS: ABNORMAL

## 2021-05-25 ENCOUNTER — TELEPHONE (OUTPATIENT)
Dept: ENT CLINIC | Age: 41
End: 2021-05-25

## 2021-05-25 DIAGNOSIS — A49.01 MSSA (METHICILLIN SUSCEPTIBLE STAPHYLOCOCCUS AUREUS): Primary | ICD-10-CM

## 2021-05-25 RX ORDER — AMOXICILLIN AND CLAVULANATE POTASSIUM 875; 125 MG/1; MG/1
1 TABLET, FILM COATED ORAL 2 TIMES DAILY
Qty: 14 TABLET | Refills: 0 | Status: SHIPPED | OUTPATIENT
Start: 2021-05-25 | End: 2021-06-01

## 2021-05-25 NOTE — TELEPHONE ENCOUNTER
Called placed to patient to let him know of his culture results and the medication that he was prescribed. Patient verbalized understanding.

## 2021-05-25 NOTE — TELEPHONE ENCOUNTER
Patient called requesting his results for his sinus culture. I informed him that I would leave a message with Dr. Shira Lobo. He verbalized understanding. Patient's number is [phone].

## 2021-05-25 NOTE — TELEPHONE ENCOUNTER
Attempted to call patient back-reached voicemail with identifiers. Asked him to call the clinic back at [phone]. Patient calls back, please let him know that his nasal cultures grew back Staph. aureus which is penicillin sensitive - we have prescribed him a week of oral Augmentin, and mupirocin ointment to be used twice daily.

## 2021-06-03 DIAGNOSIS — R42 VERTIGO: ICD-10-CM

## 2021-06-03 RX ORDER — MECLIZINE HYDROCHLORIDE 25 MG/1
TABLET ORAL
Qty: 30 TABLET | Refills: 0 | Status: SHIPPED | OUTPATIENT
Start: 2021-06-03

## 2021-06-17 NOTE — PROGRESS NOTES
Syl Go  1980, 46 y.o. male   [id number]     Referring Provider: Case Shankar MD  Referral Type: In an order in [address] [place] [address]    Reason for Visit: Evaluation of suspected change in hearing, tinnitus, or balance. VESTIBULAR EVALUATION - VIDEONYSTAGMOGRAPHY (VNG)      Syl Go was seen today, 6/18/2021, for videonystagmography (VNG) evaluation. The VNG is an examination of the central vestibulo-ocular pathway that is measured via eye movements. IMPRESSIONS:      Abnormal VNG. Spontaneous nystagmus observed in left and center gaze conditions can suggest a central pathology. Caloric irrigations and all other sub test results within normal limits. See scanned for full report 6/18/2021        VESTIBULAR/AUDIOLOGIC AND PERTINENT MEDICAL HISTORY:      Chief Complaint/Description of Symptoms:   Description: true vertigo, nausea, and headache  Onset: about a month ago  Duration: minutes to hours  Frequency: 6-8 episodes since onset  Symptoms worse since onset. Symptoms alleviated by: laying on left side, looking up to the right     Symptoms made worse by: closing eyes    Patient's current ranking of dizziness/imbalance/unsteadiness on a scale of 0-10 for today: 0/10    Activities that provoke or aggravate symptoms and other associated symptoms:   Positional Changes: standing up from sitting, looking UP   Sensory/Gait Disturbances: No significant factors reported   Pressure/Sound Induced Vertigo/Dizziness/Imbalance: No significant factors reported   Psychological Factors: No significant factors reported   Cardiovascular Factors: No significant factors reported    Previous History of Dizziness:    Previous VNG or balance assessment: no   Previous balance therapy: no    Neck Pain/Stiffness (Orthopedic):  No significant factors reported    Neurological Factors:  none    Audiologic/Otologic History:  Last audiogram: 4/20/21, Results: AS: Hearing WNL, AD: Moderate to Moderately-Severe SNHL. Vision History:  Glasses: none. Contacts: none. Vision problems: none    Headache/Migraine History:  positive for headache    Headache/migraine frequency and duration: daily (triggered with sinus issues)   Pain intensity: mild   Localize: no   Throbbing: no   Motion Sickness: no   Activity limitation due to headache: no   Headache every day?: no   Take OTC medications more than 4 days per week?: no   Associated Headache/Migraine Symptoms:  o photophobia, nausea/emesis  o Aura (before/during):  - Visual aura: none   Headache/Migraine Triggers:  o Hormonal: none  o Food: none  o Food Additives: none  o Drinks: none  o Stress: none  o Changes in sleep: none  o Physical exertion: none  o Environmental: none  o Medications: none    Fall Risk History:  Number of lifetime falls: 0    Number of falls in past 12 months: 0  Fall injury?: No  Use of Psychoactive Medication: no  Ambulatory Assistive Device Use: No  Fear of Falling: No   If yes, any activity restriction:  inside home - No, outside home - No.    Family History:   No family history on file. Positive for: balance issues mother    Medical History:  Patient Active Problem List   Diagnosis    Incisional hernia, without obstruction or gangrene    Non-recurrent unilateral inguinal hernia without obstruction or gangrene    Postoperative pain        Medication, Drugs, Alcohol:  Patient reports use of the following in the past 48 hours: none  Patient reported adhering to pre-test instructions: Yes    TESTS COMPLETED AND RESULTS:      QUESTIONNAIRES:  Dizziness Handicap Inventory: Patient's score = 34       0-14 = No handicap     16-34 = Mild handicap     36-52 = Moderate handicap     54+ = Severe handicap      GAZE:   - DOWN: With Fixation: Normal, Without Fixation: Normal   - UP:  With Fixation: Normal, Without Fixation: Normal   - LEFT: With Fixation: Normal, Without Fixation: Normal   - RIGHT: With Fixation: Normal, Without Fixation:

## 2021-06-18 ENCOUNTER — TRANSFERRED RECORDS (OUTPATIENT)
Dept: HEALTH INFORMATION MANAGEMENT | Facility: CLINIC | Age: 41
End: 2021-06-18

## 2021-06-18 ENCOUNTER — PROCEDURE VISIT (OUTPATIENT)
Dept: AUDIOLOGY | Age: 41
End: 2021-06-18
Payer: COMMERCIAL

## 2021-06-18 DIAGNOSIS — R42 DIZZINESS: Primary | ICD-10-CM

## 2021-06-18 PROCEDURE — 92538 CALORIC VSTBLR TEST W/REC: CPT | Performed by: AUDIOLOGIST

## 2021-06-18 PROCEDURE — 92540 BASIC VESTIBULAR EVALUATION: CPT | Performed by: AUDIOLOGIST

## 2021-06-18 NOTE — PROGRESS NOTES
Gayle Villa,  Thanks - I call him with the results, he will follow up with me 6/22/2021 and will discuss (probable) vestibular migraine with him then.   Alexander Dinh

## 2021-06-18 NOTE — PATIENT INSTRUCTIONS
Dizziness: Care Instructions  Your Care Instructions  Dizziness is the feeling of unsteadiness or fuzziness in your head. It is different than having vertigo, which is a feeling that the room is spinning or that you are moving or falling. It is also different from lightheadedness, which is the feeling that you are about to faint. It can be hard to know what causes dizziness. Some people feel dizzy when they have migraine headaches. Sometimes bouts of flu can make you feel dizzy. Some medical conditions, such as heart problems or high blood pressure, can make you feel dizzy. Many medicines can cause dizziness, including medicines for high blood pressure, pain, or anxiety. If a medicine causes your symptoms, your doctor may recommend that you stop or change the medicine. If it is a problem with your heart, you may need medicine to help your heart work better. If there is no clear reason for your symptoms, your doctor may suggest watching and waiting for a while to see if the dizziness goes away on its own. Follow-up care is a key part of your treatment and safety. Be sure to make and go to all appointments, and call your doctor if you are having problems. It's also a good idea to know your test results and keep a list of the medicines you take. How can you care for yourself at home? · If your doctor recommends or prescribes medicine, take it exactly as directed. Call your doctor if you think you are having a problem with your medicine. · Do not drive while you feel dizzy. · Try to prevent falls. Steps you can take include:  ? Using nonskid mats, adding grab bars near the tub, and using night-lights. ? Clearing your home so that walkways are free of anything you might trip on.  ? Letting family and friends know that you have been feeling dizzy. This will help them know how to help you. When should you call for help? Call 911 anytime you think you may need emergency care.  For example, call if:    · You passed out (lost consciousness). · You have dizziness along with symptoms of a heart attack. These may include:  ? Chest pain or pressure, or a strange feeling in the chest.  ? Sweating. ? Shortness of breath. ? Nausea or vomiting. ? Pain, pressure, or a strange feeling in the back, neck, jaw, or upper belly or in one or both shoulders or arms. ? Lightheadedness or sudden weakness. ? A fast or irregular heartbeat. · You have symptoms of a stroke. These may include:  ? Sudden numbness, tingling, weakness, or loss of movement in your face, arm, or leg, especially on only one side of your body. ? Sudden vision changes. ? Sudden trouble speaking. ? Sudden confusion or trouble understanding simple statements. ? Sudden problems with walking or balance. ? A sudden, severe headache that is different from past headaches. Call your doctor now or seek immediate medical care if:    · You feel dizzy and have a fever, headache, or ringing in your ears. · You have new or increased nausea and vomiting. · Your dizziness does not go away or comes back. Watch closely for changes in your health, and be sure to contact your doctor if:    · You do not get better as expected. Where can you learn more? Go to [email or web address]://[email or web address].Brigade. org and sign in to your Linkpass account. Enter Q945 in the [facility] box to learn more about \"Dizziness: Care Instructions. \"     If you do not have an account, please click on the \"Sign Up Now\" link. Current as of: September 23, 2018  Content Version: 11.9  © 1552-4813 [email or web address]. Care instructions adapted under license by [facility] ([facility]). If you have questions about a medical condition or this instruction, always ask your healthcare professional. [place] [address] any warranty or liability for your use of this information.         Patient Education        Preventing Falls: Care Instructions  Your Care raised doorway thresholds, throw rugs, and clutter. Repair loose carpet or raised areas in the floor. · Move furniture and electrical cords to keep them out of walking paths. · Use nonskid floor wax, and wipe up spills right away, especially on ceramic tile floors. · If you use a walker or cane, put rubber tips on it. If you use crutches, clean the bottoms of them regularly with an abrasive pad, such as steel wool. · Keep your house well lit, especially [place], and outside walkways. Use night-lights in areas such as hallways and bathrooms. Add extra light switches or use remote switches (such as switches that go on or off when you clap your hands) to make it easier to turn lights on if you have to get up during the night. · Install sturdy handrails on stairways. · Move items in your cabinets so that the things you use a lot are on the lower shelves (about waist level). · Keep a cordless phone and a flashlight with new batteries by your bed. If possible, put a phone in each of the main rooms of your house, or carry a cell phone in case you fall and cannot reach a phone. Or, you can wear a device around your neck or wrist. You push a button that sends a signal for help. · Wear low-heeled shoes that fit well and give your feet good support. Use footwear with nonskid soles. Check the heels and soles of your shoes for wear. Repair or replace worn heels or soles. · Do not wear socks without shoes on wood floors. · Walk on the grass when the sidewalks are slippery. If you live in an area that gets snow and ice in the winter, sprinkle salt on slippery steps and sidewalks. Preventing falls in the bath  · Install grab bars and nonskid mats inside and outside your shower or tub and near the toilet and sinks. · Use shower chairs and bath benches. · Use a hand-held shower head that will allow you to sit while showering.   · Get into a tub or shower by putting the weaker leg in first. Get out of a tub or

## 2021-06-18 NOTE — Clinical Note
Dr. Wilks Rather,    Please see note from this patient's VNG testing from today. Please let me know if there is anything further you need.         Dahlia Seaman  Audiologist

## 2021-06-22 ENCOUNTER — OFFICE VISIT (OUTPATIENT)
Dept: ENT CLINIC | Age: 41
End: 2021-06-22
Payer: COMMERCIAL

## 2021-06-22 VITALS
SYSTOLIC BLOOD PRESSURE: 126 MMHG | HEART RATE: 87 BPM | HEIGHT: 71 IN | TEMPERATURE: 97.9 F | DIASTOLIC BLOOD PRESSURE: 78 MMHG | BODY MASS INDEX: 34.3 KG/M2 | WEIGHT: 245 LBS

## 2021-06-22 DIAGNOSIS — G43.809 VESTIBULAR MIGRAINE: Primary | ICD-10-CM

## 2021-06-22 PROCEDURE — 99213 OFFICE O/P EST LOW 20 MIN: CPT | Performed by: OTOLARYNGOLOGY

## 2021-06-22 RX ORDER — TOPIRAMATE 25 MG/1
25 TABLET ORAL 2 TIMES DAILY
Qty: 180 TABLET | Refills: 1 | Status: SHIPPED | OUTPATIENT
Start: 2021-06-22 | End: 2021-12-13

## 2021-06-22 RX ORDER — MAGNESIUM OXIDE/MAG AA CHELATE 300 MG
2 CAPSULE ORAL DAILY
Qty: 60 CAPSULE | Refills: 5 | Status: SHIPPED | OUTPATIENT
Start: 2021-06-22 | End: 2021-12-17

## 2021-06-22 NOTE — PROGRESS NOTES
[address] UP VISIT      Patient Name: Brittany Wolfe  Medical Record Number:  [id number]  Primary Care Physician:  Jasmyne Yoon    ChiefComplaint     Chief Complaint   Patient presents with    Results     Had VNG 06/18/2021    Follow-up     Still having vertigo attackes. Not as bad or as long but still having a few.  Other     Brought a disc of MRI in 04/2021       History of Present Illness     Brittany Wolfe is an 46 y.o. male with complaint of right ear pulsatile tinnitus, sudden hearing loss and vertigo. He states he was diagnosed with Ménière's disease at age 25 (by Dr. Binnie Severance), with a single episode of vertigo and right ear hearing loss. He had one further episode of vertigo lasting several hours while in college, but none for the intervening 10 years. Over the past 2 weeks, however, he has had two episodes of randi vertigo and nausea lasting 2-3 hours at a time, which she believes were triggered by taking Benadryl in the morning. With episodes he denies hearing loss, roaring tinnitus however states photophobia and slight headache; no aura.     He has also had constant tinnitus in the right ear since adolescence, with no pulsatile quality initially however approximately 12/2020 he had an episode of sudden hearing loss in the right ear with a change in his tinnitusis now pulsatile. Believes he can triggered by touching his facedenies any change in tinnitus with neck compression or changes in head/body position. Prior to this, he states his hearing was fluctuant in nature. No otalgia, otorrhea, ear fullness, chronic middle ear disease or ear surgery. Has been on a low-salt diet, good hydration, diuretics in adolescence but none since.     1 cup of coffee daily, no tobacco use    Interval History 5/18/2021: In the past 2 weeks he has had states that he episodes of vertigo, lasting anywhere from minuteshours at a time.   States for 10 days. 1 Tube 1    ondansetron (ZOFRAN) 4 MG tablet Take 1 tablet by mouth 3 times daily as needed for Nausea or Vomiting 15 tablet 0    fluticasone (FLONASE) 50 MCG/ACT nasal spray 1 spray by Each Nostril route 2 times daily 1 Bottle 2    Fexofenadine-Pseudoephedrine (ALLEGRA-D PO) Take by mouth (Patient not taking: Reported on 5/18/2021)       No current facility-administered medications for this visit. Review of Systems     REVIEW OF SYSTEMS  The following systems were reviewed and revealed the following in addition to any already discussed in the HPI:    CONSTITUTIONAL: No weight loss, no fever, no night sweats, no chills  EYES: no vision changes, no blurry vision  EARS: + Changes in hearing, no otalgia  NOSE: no epistaxis, no rhinorrhea  RESPIRATORY: No difficulty breathing, no shortness of breath  CV: no chest pain, no peripheral vascular disease  HEME: No coagulation disorder, no bleeding disorder  NEURO: No TIA or stroke-like symptoms  SKIN: No new rashes in the head and neck, no recent skin cancers  MOUTH: No new ulcers, no recent teeth infections  GASTROINTESTINAL: No diarrhea, stomach pain  PSYCH: No anxiety, no depression    PhysicalExam     Vitals:    06/22/21 1453   BP: 126/78   Site: Left Upper Arm   Position: Sitting   Cuff Size: Large Adult   Pulse: 87   Temp: 97.9 °F (36.6 °C)   TempSrc: Infrared   Weight: 245 lb (111.1 kg)   Height: 5' 11\" (1.803 m)       PHYSICAL EXAM  /78 (Site: Left Upper Arm, Position: Sitting, Cuff Size: Large Adult)   Pulse 87   Temp 97.9 °F (36.6 °C) (Infrared)   Ht 5' 11\" (1.803 m)   Wt 245 lb (111.1 kg)   BMI 34.17 kg/m²     GENERAL: No Acute Distress, Alert and Oriented, no hoarseness  EYES: EOMI, Anti-icteric  NOSE: On anterior rhinoscopy there is no epistaxis, nasal mucosa within normal limits, no nasal purulence noted.    EARS: Normal external appearance  -Right ear: External auditory canal without stenosis, tympanic membrane clear, no middle ear effusions or retractions  -Left ear: External auditory canal without stenosis, tympanic membrane clear, no middle ear effusions or retractions  -Tuning fork testing: With a 512-Hz tuning fork the Fernandez is midline, The Rinne on the right ear the is air>bone conduction, on the left ear air>bone conduction  FACE: 1/6 House-Brackmann Scale, symmetric, sensation equal bilaterally  ORAL CAVITY: No masses or lesions palpated, uvula is midline, moist mucous membranes, 0+ tonsils, good dentition  NECK: Normal range of motion, no thyromegaly, trachea is midline, no lymphadenopathy, no neck masses, no crepitus  CHEST: Normal respiratory effort, no retractions, breathing comfortably  SKIN: No rashes, normal appearing skin, no evidence of skin lesions/tumors  NEURO: CN 2, 3, 4, 5, 6, 7, 11, 12 intact bilaterally     Procedure     None    Assessment and Plan     1. Vertigo  Patient with vertigo lasting minuteshours, appears to be worse during seasonal changes however is also accompanied by aura and/or headaches. He has poor hearing in the right ear - uncertain if this is from prior SNHL or a component of his pathology. Given suspicion of central pathology on VNG, will start on riboflavin/magnesium supplements and topiramate - sideffects outlined, will follow up in 2 months. Will call in 2-3 weeks and if symptoms partially improved will increase dosing. Return in about 2 months (around 8/22/2021). Mary Shelley MD  [address] [place] [address],[address]  Department of Otolaryngology/Head and Neck Surgery  6/22/21    I have performed a head and neck physical exam personally or was physically present during the key or critical portions of the service. Medical Decision Making:   The following items were considered in medical decision making:  Independent review of images  Review / order clinical lab tests  Review / order radiology tests  Decision to obtain old records  Review and summation of old records as accessed through [facility] (a summary of my findings in these old records: none)     Portions of this note were dictated using Dragon.  There may be linguistic errors secondary to the use of this program.

## 2021-06-29 ENCOUNTER — TELEPHONE (OUTPATIENT)
Dept: ENT CLINIC | Age: 41
End: 2021-06-29

## 2021-06-29 NOTE — TELEPHONE ENCOUNTER
Patient calling to report symptoms seem to be improving and would possibly want to increase dosage of Topiramate. Please call patient to discuss.

## 2021-06-30 NOTE — TELEPHONE ENCOUNTER
Called the patient-he has had no attacks of vertigo, we will keep him on his current dose of topiramate

## 2021-07-29 NOTE — PROGRESS NOTES
Thaddeus Huff  1980.41 y.o. male   [id number]      HEARING AID EVALUATION    Thaddeus Huff was seen today, 7/30/2021 , for a Hearing Aid Evaluation following audiologic evaluation on 4/20/2021. Patient has no prior history of hearing aid use. Patient was found to have insurance benefit of 100% after $6,000.00 deductible is met for hearing aids. Per benefit check, patient has not satisfied deductible at this time. Patient understands he is responsible for cost of devices. Hearing aid benefit worksheet scanned into media tab. DATE: 7/30/2021     PROCEDURES:     SOUNDFIELD TESTING:     Name of test Type of amplification Level of signal Level of noise % Correct        Nu-6  None 55dBHL N/A DNT        Nu-6  None 45dBHL N/A 100%    QuickSIN None 70dBHL varied 0.5 SNR loss     Aided testing was completed with [facility] Taty Gallus P90-R right aid using AzBio Sentences at 60  (55 dBHL) with 45 dBHL of noise- noise = good ear (left), speech = bad ear (right)     Name of test List # Type of amplification Level of signal Level of noise % Correct   AzBio 1 None 55 dBHL 45 dBHL 95%   AzBio 2 Phonak Audeo P90-R (right) 55 dBHL 45 dBHL 100%       LIFESTYLE EVALUATION:      How do you spend most of your day? 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 Which ear do you use on the phone? Left         Land line or cell phone  [facility]     Has anyone recommended you wear HAs before? No      If yes, have you tried HAs? If no, why not? Do you feel like your hearing loss limits or hampers your personal or social life in any way? No    Does a hearing problem cause you to feel embarrassed when you meet new people? No    How do you compensate for things you miss or misunderstand?   Reposition away from bad side     Does a hearing problem cause you to feel frustrated when talking to members of your family? Yes      Do they get frustrated with you? No    How does it make you feel when you miss things? Worried about getting frustrated     Does your hearing problem cause you difficulty when visiting friends, relatives, or neighbors? Yes- car difficult     Does your hearing problem cause you difficulty when listening to a TV or radio? Yes    Do others feel that your TV/radio is too loud? Yes    Does a hearing problem cause you difficulty when in a restaurant with relatives or friends? Yes    What % of conversation do you feel that you hear in groups/restaurants? Depends on the situation and speaker- tries to be mindful of position in noise especially with work, misses up to 50% if in middle of table. When is your hearing loss most problematic?  - background noise/ larger group     In what situation would you most like to hear better? 1. Larger groups and multiple conversation   2. Car especially with children     After a discussion of evaluation results, discussion of levels of technology and prices, and lifestyle assessment, Tiffanie Smith has decided to consider the options and contact Audiology when a decision has been made. .     Technology to be considered: Whitney MELISSA (technology TBD). Picked color P7,  power 1(M), medium power dome, AU. Patient cost due at time of fitting: TBD dependent on level of technology. Patient is interested in premium and advanced quoted at $3,100.00 and $2,600.00 respectively ($300.00 less for standard battery)    PATIENT EDUCATION:      - Verbally reviewed benefits and limitations of devices and available features in hearing aids. - Verbally discussed realistic expectations of hearing aid use     Information was shared verbally with patient during appointment. RECOMMENDATIONS:      - Contact Audiology when a decision has been made to pursue hearing aids. No charge for today's appointment.       Brenda Hubbard, AuD  Audiologist

## 2021-07-30 ENCOUNTER — PROCEDURE VISIT (OUTPATIENT)
Dept: AUDIOLOGY | Age: 41
End: 2021-07-30

## 2021-07-30 DIAGNOSIS — H93.11 TINNITUS, RIGHT EAR: ICD-10-CM

## 2021-07-30 DIAGNOSIS — H90.41 SENSORINEURAL HEARING LOSS (SNHL) OF RIGHT EAR WITH UNRESTRICTED HEARING OF LEFT EAR: Primary | ICD-10-CM

## 2021-08-03 ENCOUNTER — TELEPHONE (OUTPATIENT)
Dept: ENT CLINIC | Age: 41
End: 2021-08-03

## 2021-08-03 NOTE — TELEPHONE ENCOUNTER
Patient's wife called with warranty questions about hearing aids.   Would like a call back at [phone]

## 2021-08-04 NOTE — TELEPHONE ENCOUNTER
Returned call. Patient's wife, Georgina Clayton, had questions regarding loss and damage warranty. Discussed loss and damage replacement policy 1x per ear over 3 years for $350.00.

## 2021-08-13 DIAGNOSIS — R09.81 NASAL CONGESTION: ICD-10-CM

## 2021-08-17 RX ORDER — FLUTICASONE PROPIONATE 50 MCG
SPRAY, SUSPENSION (ML) NASAL
Qty: 1 BOTTLE | Refills: 2 | Status: SHIPPED | OUTPATIENT
Start: 2021-08-17 | End: 2021-12-20

## 2021-08-24 ENCOUNTER — OFFICE VISIT (OUTPATIENT)
Dept: ENT CLINIC | Age: 41
End: 2021-08-24
Payer: COMMERCIAL

## 2021-08-24 VITALS — BODY MASS INDEX: 34.3 KG/M2 | WEIGHT: 245 LBS | HEIGHT: 71 IN | TEMPERATURE: 97.7 F

## 2021-08-24 DIAGNOSIS — G43.809 VESTIBULAR MIGRAINE: Primary | ICD-10-CM

## 2021-08-24 DIAGNOSIS — H91.91 UNILATERAL HEARING LOSS, RIGHT: ICD-10-CM

## 2021-08-24 PROCEDURE — 99213 OFFICE O/P EST LOW 20 MIN: CPT | Performed by: OTOLARYNGOLOGY

## 2021-08-24 NOTE — PROGRESS NOTES
[address] UP VISIT      Patient Name: Julio César Pineda  Medical Record Number:  [id number]  Primary Care Physician:  Kianna Garcia    ChiefComplaint     Chief Complaint   Patient presents with    Follow-up     Has started migraine medcation and has not had any vertigo episdoes. Does still feel \"off\" sometimes. History of Present Illness     Julio César Pineda is an 46 y.o. male with complaint of right ear pulsatile tinnitus, sudden hearing loss and vertigo. He states he was diagnosed with Ménière's disease at age 25 (by Dr. Jovita Mathis), with a single episode of vertigo and right ear hearing loss. He had one further episode of vertigo lasting several hours while in college, but none for the intervening 10 years. Over the past 2 weeks, however, he has had two episodes of randi vertigo and nausea lasting 2-3 hours at a time, which she believes were triggered by taking Benadryl in the morning. With episodes he denies hearing loss, roaring tinnitus however states photophobia and slight headache; no aura.     He has also had constant tinnitus in the right ear since adolescence, with no pulsatile quality initially however approximately 12/2020 he had an episode of sudden hearing loss in the right ear with a change in his tinnitusis now pulsatile. Believes he can triggered by touching his facedenies any change in tinnitus with neck compression or changes in head/body position. Prior to this, he states his hearing was fluctuant in nature. No otalgia, otorrhea, ear fullness, chronic middle ear disease or ear surgery. Has been on a low-salt diet, good hydration, diuretics in adolescence but none since.     1 cup of coffee daily, no tobacco use    Interval History 5/18/2021: In the past 2 weeks he has had states that he episodes of vertigo, lasting anywhere from minuteshours at a time.   States headaches nausea with most episodes, is uncertain if tinnitus capsule by mouth daily 30 capsule 5    topiramate (TOPAMAX) 25 MG tablet Take 1 tablet by mouth 2 times daily 180 tablet 1    meclizine (ANTIVERT) 25 MG tablet TAKE 1 TABLET BY MOUTH 3 TIMES A DAY AS NEEDED FOR DIZZINESS 30 tablet 0    mupirocin (BACTROBAN) 2 % ointment Apply to the nostrils two times daily for 10 days. 1 Tube 1    ondansetron (ZOFRAN) 4 MG tablet Take 1 tablet by mouth 3 times daily as needed for Nausea or Vomiting 15 tablet 0    Fexofenadine-Pseudoephedrine (ALLEGRA-D PO) Take by mouth (Patient not taking: Reported on 5/18/2021)       No current facility-administered medications for this visit. Review of Systems     REVIEW OF SYSTEMS  The following systems were reviewed and revealed the following in addition to any already discussed in the HPI:    CONSTITUTIONAL: No weight loss, no fever, no night sweats, no chills  EYES: no vision changes, no blurry vision  EARS: + Changes in hearing, no otalgia  NOSE: no epistaxis, no rhinorrhea  RESPIRATORY: No difficulty breathing, no shortness of breath  CV: no chest pain, no peripheral vascular disease  HEME: No coagulation disorder, no bleeding disorder  NEURO: No TIA or stroke-like symptoms  SKIN: No new rashes in the head and neck, no recent skin cancers  MOUTH: No new ulcers, no recent teeth infections  GASTROINTESTINAL: No diarrhea, stomach pain  PSYCH: No anxiety, no depression    PhysicalExam     Vitals:    08/24/21 1129   Temp: 97.7 °F (36.5 °C)   TempSrc: Infrared   Weight: 245 lb (111.1 kg)   Height: 5' 11\" (1.803 m)       PHYSICAL EXAM  Temp 97.7 °F (36.5 °C) (Infrared)   Ht 5' 11\" (1.803 m)   Wt 245 lb (111.1 kg)   BMI 34.17 kg/m²     GENERAL: No Acute Distress, Alert and Oriented, no hoarseness  EYES: EOMI, Anti-icteric  NOSE: On anterior rhinoscopy there is no epistaxis, nasal mucosa within normal limits, no nasal purulence noted.    EARS: Normal external appearance  -Right ear: External auditory canal without stenosis, tympanic membrane clear, no middle ear effusions or retractions  -Left ear: External auditory canal without stenosis, tympanic membrane clear, no middle ear effusions or retractions  -Negative fistula test bilaterally. FACE: 1/6 House-Brackmann Scale, symmetric, sensation equal bilaterally  ORAL CAVITY: No masses or lesions palpated, uvula is midline, moist mucous membranes, 0+ tonsils, good dentition  NECK: Normal range of motion, no thyromegaly, trachea is midline, no lymphadenopathy, no neck masses, no crepitus  CHEST: Normal respiratory effort, no retractions, breathing comfortably  SKIN: No rashes, normal appearing skin, no evidence of skin lesions/tumors  NEURO: CN 2, 3, 4, 5, 6, 7, 11, 12 intact bilaterally     Procedure     None    Assessment and Plan     1. Vertigo  Patient with vertigo lasting minuteshours, appears to be worse during seasonal changes however is also accompanied by aura and/or headaches. He has poor hearing in the right ear - uncertain if this is from prior SNHL or a component of his pathology. Given suspicion of central pathology on VNG, we started him on riboflavin/magnesium supplements and topiramate -significant improvement in symptoms with no further vertigo -our working diagnosis is a vestibular migraine. We will have him continue riboflavin/magnesium, topiramate. 2. Hearing loss/tinnitus  Patient with history of hearing loss at age 25 of the right ear. MRI without findings, at this point we do not believe that pharmacotherapy or transtympanic steroids would be appropriate for hearing recovery, and we will plan for rehabilitation of the right ear with hearing aid. He is attempted this during a hearing evaluation, with good results; he is otherwise cleared for hearing aid    Return in about 6 months (around 2/24/2022).     Dickson Troy MD  [address] [place] [address],[address]  Department of Otolaryngology/Head and Neck Surgery  8/24/21    I have performed a head and neck physical exam personally or was physically present during the key or critical portions of the service. Medical Decision Making: The following items were considered in medical decision making:  Independent review of images  Review / order clinical lab tests  Review / order radiology tests  Decision to obtain old records  Review and summation of old records as accessed through [facility] (a summary of my findings in these old records: none)     Portions of this note were dictated using Dragon.  There may be linguistic errors secondary to the use of this program.

## 2021-12-10 DIAGNOSIS — G43.809 VESTIBULAR MIGRAINE: ICD-10-CM

## 2021-12-13 RX ORDER — TOPIRAMATE 25 MG/1
25 TABLET ORAL 2 TIMES DAILY
Qty: 180 TABLET | Refills: 1 | Status: SHIPPED | OUTPATIENT
Start: 2021-12-13 | End: 2022-01-18

## 2021-12-17 DIAGNOSIS — G43.809 VESTIBULAR MIGRAINE: ICD-10-CM

## 2021-12-17 RX ORDER — MAGNESIUM OXIDE/MAG AA CHELATE 300 MG
CAPSULE ORAL
Qty: 60 CAPSULE | Refills: 5 | Status: SHIPPED | OUTPATIENT
Start: 2021-12-17

## 2021-12-18 DIAGNOSIS — R09.81 NASAL CONGESTION: ICD-10-CM

## 2021-12-20 RX ORDER — FLUTICASONE PROPIONATE 50 MCG
SPRAY, SUSPENSION (ML) NASAL
Qty: 16 G | Refills: 3 | Status: SHIPPED | OUTPATIENT
Start: 2021-12-20

## 2021-12-22 DIAGNOSIS — G43.809 VESTIBULAR MIGRAINE: ICD-10-CM

## 2021-12-23 RX ORDER — RIBOFLAVIN (VITAMIN B2) 400 MG
TABLET ORAL
Qty: 30 TABLET | Refills: 5 | Status: SHIPPED | OUTPATIENT
Start: 2021-12-23

## 2022-01-18 ENCOUNTER — OFFICE VISIT (OUTPATIENT)
Dept: ENT CLINIC | Age: 42
End: 2022-01-18
Payer: COMMERCIAL

## 2022-01-18 VITALS
BODY MASS INDEX: 34.3 KG/M2 | SYSTOLIC BLOOD PRESSURE: 130 MMHG | HEIGHT: 71 IN | HEART RATE: 78 BPM | WEIGHT: 245 LBS | DIASTOLIC BLOOD PRESSURE: 90 MMHG | TEMPERATURE: 98.6 F

## 2022-01-18 DIAGNOSIS — H91.91 UNILATERAL HEARING LOSS, RIGHT: Primary | ICD-10-CM

## 2022-01-18 DIAGNOSIS — G43.809 VESTIBULAR MIGRAINE: ICD-10-CM

## 2022-01-18 PROCEDURE — 99214 OFFICE O/P EST MOD 30 MIN: CPT | Performed by: OTOLARYNGOLOGY

## 2022-01-18 RX ORDER — TOPIRAMATE 25 MG/1
50 TABLET ORAL 2 TIMES DAILY
Qty: 180 TABLET | Refills: 1 | Status: SHIPPED | OUTPATIENT
Start: 2022-01-18

## 2022-01-18 RX ORDER — AMOXICILLIN AND CLAVULANATE POTASSIUM 875; 125 MG/1; MG/1
TABLET, FILM COATED ORAL
COMMUNITY
Start: 2022-01-15

## 2022-01-18 NOTE — PATIENT INSTRUCTIONS
-Search for a NEURO-Otologist in [place]  -CAN increase topiramate to 50mg twice daily if desired  -Continue magnesium, riboflavin supplements  -Send a mychart message with any issues

## 2022-01-18 NOTE — PROGRESS NOTES
[address] UP VISIT      Patient Name: Charis Nugent  Medical Record Number:  [id number]  Primary Care Physician:  Saintclair Birk    ChiefComplaint     Chief Complaint   Patient presents with    Follow-up     Nasal congestion has been horrible, migraines were doing good. Recent job change and is moving. History of Present Illness     Charis Nugent is an 46 y.o. male with complaint of right ear pulsatile tinnitus, sudden hearing loss and vertigo. He states he was diagnosed with Ménière's disease at age 25 (by Dr. Moira Halsted), with a single episode of vertigo and right ear hearing loss. He had one further episode of vertigo lasting several hours while in college, but none for the intervening 10 years. Over the past 2 weeks, however, he has had two episodes of randi vertigo and nausea lasting 2-3 hours at a time, which she believes were triggered by taking Benadryl in the morning. With episodes he denies hearing loss, roaring tinnitus however states photophobia and slight headache; no aura.     He has also had constant tinnitus in the right ear since adolescence, with no pulsatile quality initially however approximately 12/2020 he had an episode of sudden hearing loss in the right ear with a change in his tinnitusis now pulsatile. Believes he can triggered by touching his facedenies any change in tinnitus with neck compression or changes in head/body position. Prior to this, he states his hearing was fluctuant in nature. No otalgia, otorrhea, ear fullness, chronic middle ear disease or ear surgery. Has been on a low-salt diet, good hydration, diuretics in adolescence but none since.     1 cup of coffee daily, no tobacco use    Interval History 5/18/2021: In the past 2 weeks he has had states that he episodes of vertigo, lasting anywhere from minuteshours at a time.   States headaches nausea with most episodes, is uncertain if tinnitus has worsened during episodes or if he has worse hearing loss; occasionally has an aura/feels that her vertigo is going to occur or that he has imbalance -for no scotomas. Vertigo is worse when he closes his eyes. Drinks 64 ounces of water daily. Interval History 6/22/2021: Approximately 10-12 episodes of vertigo lasting 10 minutes (improve with head tilt to the left) with dysequlibrium lasting an hour. No improvement with meclizine during attacks. Believes EtOH might be a trigger. [facility] at [facility] without report of neoplasm/CPA mass. VNG suggestive of central pathology - of interest no peripheral deficit on calorics. Interval History 8/2/2021: No vertiginous episodes since starting topiramate. Continues to have intermittent disequilibrium, however does not progress to randi vertigo. Also with hearing tinnitus of the right ear which significantly improved during hearing aid eval.    Interval History 1/18/2021: Has had one episode lasting 2-3 seconds while driving back from [place]. Has had chronic neck pain - possibly musculoskeletal in origin -on going for several weeks but has had it previously. Had severe facial pressure/fullness over the weekend, started Augmentin with significantly improved facial pain and neck pain. Significant facial pressure and pain when flying - vertigo (1-2s) noted on descent. Past Medical History     No past medical history on file. Past Surgical History     Past Surgical History:   Procedure Laterality Date    CHOLECYSTECTOMY      GALLBLADDER SURGERY      HERNIA REPAIR N/A 9/16/2020    ROBOTIC RIGHT INGUINAL HERNIA REPAIR WITH MESH AND EPIGASTRIC HERNIA REPAIR WITH MESH performed by Jenny Coy MD at [facility] .       Family History     No family history on file.     Social History     Social History     Tobacco Use    Smoking status: Never Smoker    Smokeless tobacco: Never Used   Vaping Use    Vaping Use: Never used   Substance Use Topics    Medium Adult   Pulse: 78   Temp: 98.6 °F (37 °C)   TempSrc: Infrared   Weight: 245 lb (111.1 kg)   Height: 5' 11\" (1.803 m)       PHYSICAL EXAM  BP (!) 130/90 (Site: Right Upper Arm, Position: Sitting, Cuff Size: Medium Adult)   Pulse 78   Temp 98.6 °F (37 °C) (Infrared)   Ht 5' 11\" (1.803 m)   Wt 245 lb (111.1 kg)   BMI 34.17 kg/m²     GENERAL: No Acute Distress, Alert and Oriented, no hoarseness  EYES: EOMI, Anti-icteric  NOSE: On anterior rhinoscopy there is no epistaxis, nasal mucosa within normal limits, no nasal purulence noted. EARS: Normal external appearance  -Right ear: External auditory canal without stenosis, tympanic membrane clear, no middle ear effusions or retractions  -Left ear: External auditory canal without stenosis, tympanic membrane clear, no middle ear effusions or retractions  -Negative fistula test bilaterally. FACE: 1/6 House-Brackmann Scale, symmetric, sensation equal bilaterally  ORAL CAVITY: No masses or lesions palpated, uvula is midline, moist mucous membranes, 0+ tonsils, good dentition  NECK: Normal range of motion, no thyromegaly, trachea is midline, no lymphadenopathy, no neck masses, no crepitus  CHEST: Normal respiratory effort, no retractions, breathing comfortably  SKIN: No rashes, normal appearing skin, no evidence of skin lesions/tumors  NEURO: CN 2, 3, 4, 5, 6, 7, 11, 12 intact bilaterally     Procedure     None    Assessment and Plan     1. Vestibular migraine  Patient with vertigo lasting minuteshours, appears to be worse during seasonal changes however is also accompanied by aura and/or headaches. He has poor hearing in the right ear - uncertain if this is from prior SNHL or a component of his pathology. Given suspicion of central pathology on VNG, we started him on riboflavin/magnesium supplements and topiramate -significant improvement in symptoms however recent stress, with occasional vertigo.   We will increase his topiramate to 50 mg twice daily and have asked him to call us back with side effects. He is moving to [place] soon, and we have asked him to seek out a neuro otologist or a physician who deals with hearing/balance issues for follow-up. 2. Hearing loss/tinnitus  Patient with history of hearing loss at age 25 of the right ear. Rehabilitation via hearing aid. Return if symptoms worsen or fail to improve. Marcial Wyatt MD  [address] [place] [address],[address]  Department of Otolaryngology/Head and Neck Surgery  1/18/22    I have performed a head and neck physical exam personally or was physically present during the key or critical portions of the service. Medical Decision Making: The following items were considered in medical decision making:  Independent review of images  Review / order clinical lab tests  Review / order radiology tests  Decision to obtain old records  Review and summation of old records as accessed through [facility] (a summary of my findings in these old records: none)     Portions of this note were dictated using Dragon.  There may be linguistic errors secondary to the use of this program.

## 2022-02-07 ENCOUNTER — MEDICAL CORRESPONDENCE (OUTPATIENT)
Dept: HEALTH INFORMATION MANAGEMENT | Facility: CLINIC | Age: 42
End: 2022-02-07
Payer: COMMERCIAL

## 2022-02-07 ENCOUNTER — TELEPHONE (OUTPATIENT)
Dept: OTOLARYNGOLOGY | Facility: CLINIC | Age: 42
End: 2022-02-07
Payer: COMMERCIAL

## 2022-02-07 ENCOUNTER — TRANSCRIBE ORDERS (OUTPATIENT)
Dept: OTHER | Age: 42
End: 2022-02-07
Payer: COMMERCIAL

## 2022-02-07 DIAGNOSIS — R42 VERTIGO: Primary | ICD-10-CM

## 2022-02-07 NOTE — TELEPHONE ENCOUNTER
[facility] Call Center    Phone Message    May a detailed message be left on voicemail: no     Reason for Call: Appointment Intake    Referring Provider Name: Humble Banda MD    [facility]   [address]     [address]    [place] [address]    Phone: [phone]    Fax: [phone]   Diagnosis and/or Symptoms: Vertigo     Records in [facility]    Action Taken: Message routed to:  [facility] ([facility]): CLINIC COORDINATORS-EYE-DENTAL-ENT- [[id number]]    Travel Screening: Not Applicable

## 2022-02-10 ENCOUNTER — TELEPHONE (OUTPATIENT)
Dept: OTOLARYNGOLOGY | Facility: CLINIC | Age: 42
End: 2022-02-10
Payer: COMMERCIAL

## 2022-02-14 NOTE — TELEPHONE ENCOUNTER
1. Have you noticed any changes in hearing? Yes  2. Do you have ringing, buzzing, or other sounds in your ears or head, this is also referred to as Tinnitus? Yes  3. When and where was your last hearing test? 4/2021 [facility]  4. Do you feel lightheaded or foggy? Yes  5. Do you have a spinning sensation? Yes  6. Is there any specific position that can bring on dizziness? Random/back of head and neck  7. Does looking up cause dizziness? No  8. Does getting in and our of bed cause dizziness? Yes  9. Does turning over in bed increase or cause dizziness? No  10. Does bending over cause dizziness? Yes  11. Is there anything that you can do to prevent the dizziness? Meds,   12. Has the dizziness gotten better with time? No  13. Have you seen Physical Therapy for dizziness? (Please indicate clinic and as much of the location as possible): No  14. Are you being referred to a specific physician? Yes: kiarra  15. Have you been evaluated/treated for your dizziness at any other location?  (If yes,obtian as much clinic/provider/locaiton as possible) Yes. (If yes answer the following questions:)   Have you seen any ENT, Neurology, or other providers for these symptoms?             Yes, If yes, where?  [facility],  Other ENTs but doesn't know names of any of them   if yes, who?    Have you had any balance or Audiology testing? Yes, If yes, where? [facility]  if yes, who?  Have you had an MRI or CT scan of your head or neck? Yes, If yes, where? [facility] if yes, who?     Would you like to receive your Release of Information by mail or e-mail?  e-mail

## 2022-02-14 NOTE — TELEPHONE ENCOUNTER
Health Call Center    Phone Message    May a detailed message be left on voicemail: yes     Reason for Call: Other: Paul called to schedule his appointment with Dr. Cuba in regards to vertigo. He states that anything would work, as his work schedule is pretty flexible. Please reach out to Paul with scheduling options when possible. Thank you!     Action Taken: Message routed to:  [facility] ([facility]): ent    Travel Screening: Not Applicable

## 2022-02-15 NOTE — TELEPHONE ENCOUNTER
FUTURE VISIT INFORMATION      FUTURE VISIT INFORMATION:    Date: 22    Time: 11:30AM    Location: [facility]  REFERRAL INFORMATION:    Referring provider:  Humble Banda MD    Referring providers clinic:  [facility]     Reason for visit/diagnosis  Vertigo- Referred by Humble Banda MD [facility]     RECORDS REQUESTED FROM:       Clinic name Comments Records Status Imaging Status   [facility]    [address]    [address]    [place] [address]    [phone]      Med recs Phone -  [phone]  Fax - [phone] 21 hearing aid eval with Diana Deshpande AuD      req 2/15/22, 22 - received 3/1/22  6/18/21 VNG with Keron Parra AuD   21 audiogram with Diana Deshpande AuD   Care everywhere     [address]    [address]    [place] [address]    [phone]   22 note from Humble Banda MD       [facility]  [address] [place] [address]   Ph. [phone] ([phone])   Med recs ph. [phone]  Med fx. [phone]  [email or web address]    [facility]   [address], [place] [address]  ph. [phone]  Med recs. [email or web address]         2021 MR Brain     *trackin req 2/15/22, 22 - received 22 req 2/15/22, 22, 22 - received 3/1/22      MRI - NO RECS      req 2/15/22 - NO RECS  req 2/15/22                 2/15/22 11:30AM sent a fax to [facility] for records and travis and [facility] for images - Amay   *received a fax from [facility], no recs, waiting for other facilities - Amay   22 11:48AM sent a 2nd fax for recs and images - Amay   22 12:04PM called [facility], it takes them 42 hours to process the request for someone to work on it. The 2/15 req was logged on  and they asked that I call back on Monday for an ETA on the records. Called [facility] and spoke with Centralized medical records  [facility] who receives all the requests but they ask that I call [facility] directly because that is where the images were done at (ph. [phone]). Called [facility], they only have 1 image, will fax report. Asked that [employer] label and request be emailed to [email or web address] - Amay   2/25/22 11AM imaging report received, waiting for images  - Amay   3/1/22 11:46AM received disc from [facility] and left a message with [facility] medical records. Called [facility] directly and spoke with a rep who stated she will try to fax the records today - Amay   *received recs, sent to scan. Sent a delayed message to audiology to review - Amay

## 2022-03-07 NOTE — TELEPHONE ENCOUNTER
Requesting orders for hearing test prior to patient's ENT appointment with Angelica Cuba M.D. ** Recommendations may change pending physician review.       Chart Review: Patient was reportedly given a diagnosis of Meniere's disease at age 21 with a single episode of vertigo and right hearing loss. Patient had essentially no symptoms for 10+ years. Was seen at [facility] in [place] in 2021 for episodic vertigo and right pulsatile tinnitus. Patient has had constant right tinnitus since adolescence, but pulsatile quality was new, and reportedly triggered by touching his face. Prior to this patient reported fluctuating hearing. His episodic vertigo was accompanied by nausea, light sensitivity, and slight headache.     VNG testing completed at [facility] 6/18/21 showing normal monothermal warm calorics, and spontaneous left beating nystagmus during Center and Left Gaze without fixation.     Audio 4/20/21 [facility]: Normal hearing in the left ear, and moderately-severe SNHL in the right ear.    MRI: No acute intracranial abnormality. Minimal white matter changes.     Patient was diagnosed with vestibular migraine, and started on topiramate, with significant improvement in symptoms. Moving to [place], needs to establish care with ENT.      Winifred Perdomo.  Licensed Audiologist  [place] # [id number]

## 2022-03-15 NOTE — TELEPHONE ENCOUNTER
LM for to call back to further discuss upcoming appt. CB number provided.     Would like to offer referral to Ila Rodriguez for topimax management, and keep [facility]re[facility] appt for other ear issues. Did not leave this info in message, but will wait to discuss when he calls back.

## 2022-04-03 ENCOUNTER — HEALTH MAINTENANCE LETTER (OUTPATIENT)
Age: 42
End: 2022-04-03

## 2022-05-06 ENCOUNTER — OFFICE VISIT (OUTPATIENT)
Dept: AUDIOLOGY | Facility: CLINIC | Age: 42
End: 2022-05-06

## 2022-05-06 ENCOUNTER — PRE VISIT (OUTPATIENT)
Dept: OTOLARYNGOLOGY | Facility: CLINIC | Age: 42
End: 2022-05-06

## 2022-05-06 ENCOUNTER — OFFICE VISIT (OUTPATIENT)
Dept: OTOLARYNGOLOGY | Facility: CLINIC | Age: 42
End: 2022-05-06
Payer: COMMERCIAL

## 2022-05-06 VITALS
BODY MASS INDEX: 35.14 KG/M2 | OXYGEN SATURATION: 99 % | SYSTOLIC BLOOD PRESSURE: 123 MMHG | DIASTOLIC BLOOD PRESSURE: 87 MMHG | HEART RATE: 74 BPM | WEIGHT: 251 LBS | TEMPERATURE: 98.2 F | HEIGHT: 71 IN

## 2022-05-06 DIAGNOSIS — Z01.10 ENCOUNTER FOR HEARING TEST: Primary | ICD-10-CM

## 2022-05-06 DIAGNOSIS — G43.809 VESTIBULAR MIGRAINE: ICD-10-CM

## 2022-05-06 DIAGNOSIS — H81.01 MENIERE'S DISEASE OF RIGHT EAR: Primary | ICD-10-CM

## 2022-05-06 DIAGNOSIS — Z01.10 ENCOUNTER FOR HEARING TEST: ICD-10-CM

## 2022-05-06 DIAGNOSIS — R42 VERTIGO: ICD-10-CM

## 2022-05-06 DIAGNOSIS — H90.71 MIXED HEARING LOSS OF RIGHT EAR: Primary | ICD-10-CM

## 2022-05-06 PROCEDURE — 92550 TYMPANOMETRY & REFLEX THRESH: CPT | Performed by: AUDIOLOGIST

## 2022-05-06 PROCEDURE — 92557 COMPREHENSIVE HEARING TEST: CPT | Performed by: AUDIOLOGIST

## 2022-05-06 PROCEDURE — 99205 OFFICE O/P NEW HI 60 MIN: CPT | Performed by: OTOLARYNGOLOGY

## 2022-05-06 RX ORDER — DIVALPROEX SODIUM 500 MG/1
TABLET, EXTENDED RELEASE ORAL
COMMUNITY
Start: 2022-03-17

## 2022-05-06 ASSESSMENT — PAIN SCALES - GENERAL: PAINLEVEL: NO PAIN (0)

## 2022-05-06 NOTE — PATIENT INSTRUCTIONS
You were seen in the ENT Clinic today by Dr. Cuba. If you have any questions or concerns after your appointment, please contact us (see below)      2.   Please return to clinic as needed      3.   Hearing aid consult for bicros hearing aid      4.   Continue working on migraine control, and keep your upcoming visit with the neurologist      5.   Discussed neurologist Dr. Jarvis        How to Contact Us:  Send a [employer] message to your provider. Our team will respond to you via [employer]. Occasionally, we will need to call you to get further information.  For urgent matters (Monday-Friday), call the ENT Clinic: [phone] and speak with a call center team member - they will route your call appropriately.   If you'd like to speak directly with a nurse, please find our contact information below. We do our best to check voicemail frequently throughout the day, and will work to call you back within 1-2 days. For urgent matters, please use the general clinic phone numbers listed above.      Esther ESQUEDA RN  ENT RN Care Coordinator  Direct: [phone]    Shell CORTES LPN  Direct: [phone]

## 2022-05-06 NOTE — LETTER
2022       RE: Geoff Perera  [address] [place] [address]     Dear Colleague,    Thank you for referring your patient, Geoff Perera, to the [facility] EAR NOSE [facility]AT [facility] [place] at [facility]. Please see a copy of my visit note below.      Neurotology Clinic      Name: Geoff Perera  MRN: [id number]  Age: 42 year old  : 1980  Referring provider: Dr. Humble Banda [facility] [place]  2022      Chief Complaint:   Consultation    History of Present Illness:   Geoff Perera is a 42 year old male who presents for consultation regarding right Meniere's disease and to establish care.  Consultation was requested by Dr. Humble Banda in [place]. The patient has followed with Dr. Humble Banda, at [facility] in [place]. He last visited with him earlier this year (2022) for right pulsatile tinnitus, sudden hearing loss (2020), and intermittent vertigo in the setting of right Meniere's disease diagnosed at age 21. Dr. Humble Banda felt the patient had vestibular migraines, and started the patient on riboflavin/magnesium supplements and topiramate with significant improvement in symptoms. The patient reported that he was moving here, and Dr. Banda placed a referral to my team to establish care and for further evaluation of his care. Per the medical record, between this referral and our appointment, he was seen in [facility] ED 2 months ago (2022) for several months of intermittent ear pain, nasal congestion with acute facial tingling and near syncope. The patient followed up with Dr. Andre Pena of [facility] (2022). Dr. Pena prescribed Depakote for migraine. Today, the patient reports that he moved here from [place] for a new job after completing his doctoral work. He is a  specializing in food packaging.     His most recent visit with his  otolaryngologist is documented in care everywhere and the note is pasted here:  Geoff Perera is an 41 y.o. male with complaint of right ear pulsatile tinnitus, sudden hearing loss and vertigo. He states he was diagnosed with Ménière's disease at age 21 (by Dr. Choi), with a single episode of vertigo and right ear hearing loss. He had one further episode of vertigo lasting several hours while in college, but none for the intervening 10 years. Over the past 2 weeks, however, he has had two episodes of carlos vertigo and nausea lasting 2-3 hours at a time, which she believes were triggered by taking Benadryl in the morning. With episodes he denies hearing loss, roaring tinnitus however states photophobia and slight headache; no aura.     He has also had constant tinnitus in the right ear since adolescence, with no pulsatile quality initially however approximately 12/2020 he had an episode of sudden hearing loss in the right ear with a change in his tinnitus-is now pulsatile. Believes he can triggered by touching his face-denies any change in tinnitus with neck compression or changes in head/body position. Prior to this, he states his hearing was fluctuant in nature. No otalgia, otorrhea, ear fullness, chronic middle ear disease or ear surgery. Has been on a low-salt diet, good hydration, diuretics in adolescence but none since.     1 cup of coffee daily, no tobacco use    Interval History 5/18/2021: In the past 2 weeks he has had states that he episodes of vertigo, lasting anywhere from minutes-hours at a time. States headaches nausea with most episodes, is uncertain if tinnitus has worsened during episodes or if he has worse hearing loss; occasionally has an aura/feels that her vertigo is going to occur or that he has imbalance -for no scotomas. Vertigo is worse when he closes his eyes. Drinks 64 ounces of water daily.    Interval History 6/22/2021: Approximately 10-12 episodes of vertigo lasting 10 minutes  (improve with head tilt to the left) with dysequlibrium lasting an hour. No improvement with meclizine during attacks. Believes EtOH might be a trigger. MRI at [place] without report of neoplasm/CPA mass. VNG suggestive of central pathology - of interest no peripheral deficit on calorics.     Interval History 8/2/2021: No vertiginous episodes since starting topiramate. Continues to have intermittent disequilibrium, however does not progress to carlos vertigo. Also with hearing tinnitus of the right ear which significantly improved during hearing aid eval.    Interval History 1/18/2021: Has had one episode lasting 2-3 seconds while driving back from [place]. Has had chronic neck pain - possibly musculoskeletal in origin -on going for several weeks but has had it previously. Had severe facial pressure/fullness over the weekend, started Augmentin with significantly improved facial pain and neck pain. Significant facial pressure and pain when flying - vertigo (1-2s) noted on descent.     Our history during today's visit:  He reports that his right ear 'popped' while on a plane during early high school years, and his ear has never been the same since. This episode was associated with a loss of hearing in the right ear, and subsequently he developed fluctuating sensorineural hearing loss that occurred off and on for a number of years. The notes suggest that he also had bouts of vertigo and there is a documented diagnosis of right Meniere's disease. He does not report vertigo or vestibular symptomatology.  Hearing loss was at times associated with a sense of ear fullness and pressure.  Eventually, the hearing stabilized and remained poor in the ear.  Symptoms changed with the onset of right pulsatile tinnitus and worsening right-sided hearing loss in December 2020.  The pulsatile tinnitus has resolved but hearing loss continues to be present as it has been. Then, for the last year or so, he has been experienced short  bouts of room-spinning dizziness.  While there were spontaneous episodes,  explains that he finds that most are triggered, and in particular he is able to bring on dizziness with moving his neck such that his right ear is oriented toward his right shoulder, or by propping his head up when he lays down meaning neck is flexed forward, and when he is using his hands a lot while talking to others.  This is led him to posit that there could be a vascular component to his symptoms.  Since he started Depakote, he has felt a significant improvement in his dizziness. His primary care physician has referred him for neck physical therapy/occupational Therapy given that he has developed significant muscular tension or tightness in these regions with the uptick in computer use and screen time given his the cyst composition and other activities.    He has a strong family history of migraine including his mother.  He has had migraine headaches in the past characterized by headache with nausea (but not light and sound sensitivity) after eating orange chicken from [employer]. He reports these headaches have always improved with NSAIDs. Paul reports that he has gained weight over the last few years and is currently at peak lifetime weight.  Symptoms also worsened during markedly stressful time in his life with the confluence of the pandemic and finishing doctoral work and making a large geographic move.  Paul feels that coffee may be a trigger for him.  He discussed awareness of the overlap of migraine and Ménière's and is interested in learning more about management of this as well.    Review of Systems:   Pertinent items are noted in HPI or as in patient entered ROS below, remainder of complete ROS is negative.    ENT ROS 5/3/2022   Neurology Dizzy spells, Headache   Ears, Nose, Throat Hearing loss, Ringing/noise in ears      Active Medications:     Current Outpatient Medications:      divalproex sodium extended-release  "(DEPAKOTE ER) 500 MG 24 hr tablet, Take 1 tablet at night for 1 week, then increase to 1 tablet 2 times daily., Disp: , Rfl:       Allergies:   Seasonal allergies      Past Medical History:  Past Medical History:   Diagnosis Date     Chronic sinusitis      Depressive disorder      Meniere's disease      Migraines      Sensorineural hearing loss      Tinnitus      There is no problem list on file for this patient.     Past Surgical History:  Past Surgical History:   Procedure Laterality Date     GI SURGERY      Gallbladder, hernia     TONSILLECTOMY       Family History:   Family History   Problem Relation Age of Onset     Migraines Mother      Migraines Maternal Grandmother        Social History:   Social History     Tobacco Use     Smoking status: Never Smoker     Smokeless tobacco: Never Used   Substance Use Topics     Alcohol use: Yes     Types: 1 Standard drinks or equivalent per week     Comment: 2x per month      Physical Exam:   /87   Pulse 74   Temp 98.2  F (36.8  C)   Ht 1.803 m (5' 11\")   Wt 113.9 kg (251 lb)   SpO2 99%   BMI 35.01 kg/m       Constitutional:  The patient was unaccompanied, well-groomed, and in no acute distress.     Neurologic: Alert and oriented x 3.  CN's III-XII within normal limits.  Voice normal.    Psychiatric: The patient's affect was calm, cooperative, and appropriate.     Communication:  Normal; communicates verbally, normal voice quality.   Respiratory: Breathing comfortably without stridor or exertion of accessory muscles.      Audiogram:  AUDIOGRAM: He underwent an audiogram today. This demonstrated:        Right: Speech reception threshold is 60 dB with 60% word recognition at 60 dB. Tympanogram A type   Left: Speech reception threshold is 5 dB with 100% word recognition at 45 dB. Tympanogram A type     Audiogram was independently reviewed.  Normal hearing left ear.  Moderately severe sensorineural loss with reduced word recognition score on the right.  Word " omissions for still considered serviceable with amplification.    Imaging:  MR Brain W/O & W Contrast (04/13/2022):  IMPRESSION:  1.  No acute intracranial finding.   2.  Few small T2 hyperintense foci in the right cerebral hemispheric white matter, nonspecific. These may be gliosis related to prior ischemic or inflammatory condition.     Imaging was independently reviewed.  No evidence of retrocochlear lesion.    EEG   (04/13/2022):  Written conclusion:   Normal sleep deprived EEG, Awake & Drowsy with no evidence of lateralizing abnormalities or epileptiform discharges. A normal EEG does not rule out seizure disorder.     Outside Records:   Outside records from [facility], [facility] were independently reviewed.  Prior vestibular testing reports also reviewed with no evidence of caloric asymmetry.     Assessment and Plan:  Geoff Perera is a 42 year old male who presents for consultation regarding prior diagnosis of right Meniere's disease, current diagnosis of vestibular migraine (under care of [facility]), with intermittent brief episodes of vestibular symptoms particularly exacerbated by stress and neck positions.  Thankfully, at today's visit, he reported that he has been relatively symptom-free for significant period of time and thus this is a time in which he is not necessarily looking to make dramatic changes.  We reviewed his history, course of symptoms, imaging, and audiogram.      Prior symptoms as well as documentation indicate that there was significant fluctuation in right sensorineural hearing and some vestibular symptoms and this would be consistent with right Ménière's disease.  Hearing has been stable for a number of years and remains at a level that could be assisted with amplification at his discretion.  He is previously trialed a hearing aid and may wish to have implications refitted.  Additional loss of word recognition would qualify for CROS technology at his  discretion.    We discussed the overlap of migraine and Ménière's.  He has received a diagnosis of vestibular migraine by a neurologist both in [place] and here in the [facility].  He has a family history of migraine and has had headaches stimulated by ingestion of foods likely having MSG in them as well as associated with the other known triggers, and headache has been associated with nausea.  If vestibular symptoms are associated with these headaches is very reasonable to posit vestibular migraine as a contributor.  He himself brought up the idea of there being a Venn diagram between Ménière's and migraine and indeed we do see significant overlap in the conditions.  Significant improvement in symptoms can be achieved with migraine prevention strategies.  He has trialed more than 1 medication and is currently on Depakote after discontinuing topiramate.  He is well educated on migraine triggers and did not wish to receive additional written material regarding this today.    He has specifically become interested in potential venous etiology for his symptoms.  He has noted that tilting his head in a certain direction which may block off the right jugular vein can potentially lead to additional vestibular symptoms.  He also, however, notes that symptoms are much worse during stressful periods and he is doing better at this time.  We discussed that Dr. Laquita Garcia Cha in neurology here at the [facility] has been investigating venous etiologies for symptoms such as his.  He is or will be enrolled in a muscular training strategy through PT/OT and I think it is very reasonable for him to work on opening up his cervical and shoulder-related musculature.  He is going to discuss venous outflow with them.  I would also concur with his assessment that weight reduction may be helpful.  He may wish to consult with Dr. Jarvis and we provided information regarding this. Physical therapy strategy may be appropriate.  I did express  that given his young age and manageability of symptoms, that I would not favor a surgical intervention that is investigational in nature. He will follow up in our clinic as needed.     Scribe Disclosure:  I, Cata Norbertosmith, am serving as a scribe to document services personally performed by Angelica Cuba MD at this visit, based upon the provider's statements to me. All documentation has been reviewed by the aforementioned provider prior to being entered into the official medical record.    The documentation recorded by the scribe accurately reflects the services I personally performed and the decisions made by me.     Angelica Cuba MD  Otology & Neurotology  [facility]    I spent a total of 60 minutes face-to-face, reviewing records, or coordinating care of Geoff Perera.       Again, thank you for allowing me to participate in the care of your patient.      Sincerely,    Angelica Cuba MD

## 2022-05-06 NOTE — NURSING NOTE
"Chief Complaint   Patient presents with     Consult     Vertigo's, meniere's      Blood pressure 123/87, pulse 74, temperature 98.2  F (36.8  C), height 1.803 m (5' 11\"), weight 113.9 kg (251 lb), SpO2 99 %.  Luigi Schafer LPN    "

## 2022-05-06 NOTE — PROGRESS NOTES
Neurotology Clinic      Name: Geoff Perera  MRN: [id number]  Age: 42 year old  : 1980  Referring provider: Dr. Humble Banda [facility] [place]  2022      Chief Complaint:   Consultation    History of Present Illness:   Geoff Perera is a 42 year old male who presents for consultation regarding right Meniere's disease and to establish care.  Consultation was requested by Dr. Humble Banda in [place]. The patient has followed with Dr. Humble Banda, at [facility] in [place]. He last visited with him earlier this year (2022) for right pulsatile tinnitus, sudden hearing loss (2020), and intermittent vertigo in the setting of right Meniere's disease diagnosed at age 21. Dr. Humble Banda felt the patient had vestibular migraines, and started the patient on riboflavin/magnesium supplements and topiramate with significant improvement in symptoms. The patient reported that he was moving here, and Dr. Banda placed a referral to my team to establish care and for further evaluation of his care. Per the medical record, between this referral and our appointment, he was seen in [facility] ED 2 months ago (2022) for several months of intermittent ear pain, nasal congestion with acute facial tingling and near syncope. The patient followed up with Dr. Andre Pena of [facility] (2022). Dr. Pena prescribed Depakote for migraine. Today, the patient reports that he moved here from [place] for a new job after completing his doctoral work. He is a  specializing in food packaging.     His most recent visit with his otolaryngologist is documented in care everywhere and the note is pasted here:  Geoff Perera is an 41 y.o. male with complaint of right ear pulsatile tinnitus, sudden hearing loss and vertigo. He states he was diagnosed with Ménière's disease at age 21 (by Dr. Choi), with a single episode of vertigo and right ear  hearing loss. He had one further episode of vertigo lasting several hours while in college, but none for the intervening 10 years. Over the past 2 weeks, however, he has had two episodes of carlos vertigo and nausea lasting 2-3 hours at a time, which she believes were triggered by taking Benadryl in the morning. With episodes he denies hearing loss, roaring tinnitus however states photophobia and slight headache; no aura.     He has also had constant tinnitus in the right ear since adolescence, with no pulsatile quality initially however approximately 12/2020 he had an episode of sudden hearing loss in the right ear with a change in his tinnitus-is now pulsatile. Believes he can triggered by touching his face-denies any change in tinnitus with neck compression or changes in head/body position. Prior to this, he states his hearing was fluctuant in nature. No otalgia, otorrhea, ear fullness, chronic middle ear disease or ear surgery. Has been on a low-salt diet, good hydration, diuretics in adolescence but none since.     1 cup of coffee daily, no tobacco use    Interval History 5/18/2021: In the past 2 weeks he has had states that he episodes of vertigo, lasting anywhere from minutes-hours at a time. States headaches nausea with most episodes, is uncertain if tinnitus has worsened during episodes or if he has worse hearing loss; occasionally has an aura/feels that her vertigo is going to occur or that he has imbalance -for no scotomas. Vertigo is worse when he closes his eyes. Drinks 64 ounces of water daily.    Interval History 6/22/2021: Approximately 10-12 episodes of vertigo lasting 10 minutes (improve with head tilt to the left) with dysequlibrium lasting an hour. No improvement with meclizine during attacks. Believes EtOH might be a trigger. MRI at [place] without report of neoplasm/CPA mass. VNG suggestive of central pathology - of interest no peripheral deficit on calorics.     Interval History 8/2/2021:  No vertiginous episodes since starting topiramate. Continues to have intermittent disequilibrium, however does not progress to carlos vertigo. Also with hearing tinnitus of the right ear which significantly improved during hearing aid eval.    Interval History 1/18/2021: Has had one episode lasting 2-3 seconds while driving back from [place]. Has had chronic neck pain - possibly musculoskeletal in origin -on going for several weeks but has had it previously. Had severe facial pressure/fullness over the weekend, started Augmentin with significantly improved facial pain and neck pain. Significant facial pressure and pain when flying - vertigo (1-2s) noted on descent.     Our history during today's visit:  He reports that his right ear 'popped' while on a plane during early high school years, and his ear has never been the same since. This episode was associated with a loss of hearing in the right ear, and subsequently he developed fluctuating sensorineural hearing loss that occurred off and on for a number of years. The notes suggest that he also had bouts of vertigo and there is a documented diagnosis of right Meniere's disease. He does not report vertigo or vestibular symptomatology.  Hearing loss was at times associated with a sense of ear fullness and pressure.  Eventually, the hearing stabilized and remained poor in the ear.  Symptoms changed with the onset of right pulsatile tinnitus and worsening right-sided hearing loss in December 2020.  The pulsatile tinnitus has resolved but hearing loss continues to be present as it has been. Then, for the last year or so, he has been experienced short bouts of room-spinning dizziness.  While there were spontaneous episodes,  explains that he finds that most are triggered, and in particular he is able to bring on dizziness with moving his neck such that his right ear is oriented toward his right shoulder, or by propping his head up when he lays down meaning neck is flexed  forward, and when he is using his hands a lot while talking to others.  This is led him to posit that there could be a vascular component to his symptoms.  Since he started Depakote, he has felt a significant improvement in his dizziness. His primary care physician has referred him for neck physical therapy/occupational Therapy given that he has developed significant muscular tension or tightness in these regions with the uptick in computer use and screen time given his the cyst composition and other activities.    He has a strong family history of migraine including his mother.  He has had migraine headaches in the past characterized by headache with nausea (but not light and sound sensitivity) after eating orange chicken from [employer]. He reports these headaches have always improved with NSAIDs. Paul reports that he has gained weight over the last few years and is currently at peak lifetime weight.  Symptoms also worsened during markedly stressful time in his life with the confluence of the pandemic and finishing doctoral work and making a large geographic move.  Paul feels that coffee may be a trigger for him.  He discussed awareness of the overlap of migraine and Ménière's and is interested in learning more about management of this as well.    Review of Systems:   Pertinent items are noted in HPI or as in patient entered ROS below, remainder of complete ROS is negative.    ENT ROS 5/3/2022   Neurology Dizzy spells, Headache   Ears, Nose, Throat Hearing loss, Ringing/noise in ears      Active Medications:     Current Outpatient Medications:      divalproex sodium extended-release (DEPAKOTE ER) 500 MG 24 hr tablet, Take 1 tablet at night for 1 week, then increase to 1 tablet 2 times daily., Disp: , Rfl:       Allergies:   Seasonal allergies      Past Medical History:  Past Medical History:   Diagnosis Date     Chronic sinusitis      Depressive disorder      Meniere's disease      Migraines       "Sensorineural hearing loss      Tinnitus      There is no problem list on file for this patient.     Past Surgical History:  Past Surgical History:   Procedure Laterality Date     GI SURGERY      Gallbladder, hernia     TONSILLECTOMY       Family History:   Family History   Problem Relation Age of Onset     Migraines Mother      Migraines Maternal Grandmother        Social History:   Social History     Tobacco Use     Smoking status: Never Smoker     Smokeless tobacco: Never Used   Substance Use Topics     Alcohol use: Yes     Types: 1 Standard drinks or equivalent per week     Comment: 2x per month      Physical Exam:   /87   Pulse 74   Temp 98.2  F (36.8  C)   Ht 1.803 m (5' 11\")   Wt 113.9 kg (251 lb)   SpO2 99%   BMI 35.01 kg/m       Constitutional:  The patient was unaccompanied, well-groomed, and in no acute distress.     Neurologic: Alert and oriented x 3.  CN's III-XII within normal limits.  Voice normal.    Psychiatric: The patient's affect was calm, cooperative, and appropriate.     Communication:  Normal; communicates verbally, normal voice quality.   Respiratory: Breathing comfortably without stridor or exertion of accessory muscles.      Audiogram:  AUDIOGRAM: He underwent an audiogram today. This demonstrated:        Right: Speech reception threshold is 60 dB with 60% word recognition at 60 dB. Tympanogram A type   Left: Speech reception threshold is 5 dB with 100% word recognition at 45 dB. Tympanogram A type     Audiogram was independently reviewed.  Normal hearing left ear.  Moderately severe sensorineural loss with reduced word recognition score on the right.  Word omissions for still considered serviceable with amplification.    Imaging:  MR Brain W/O & W Contrast (04/13/2022):  IMPRESSION:  1.  No acute intracranial finding.   2.  Few small T2 hyperintense foci in the right cerebral hemispheric white matter, nonspecific. These may be gliosis related to prior ischemic or inflammatory " condition.     Imaging was independently reviewed.  No evidence of retrocochlear lesion.    EEG   (04/13/2022):  Written conclusion:   Normal sleep deprived EEG, Awake & Drowsy with no evidence of lateralizing abnormalities or epileptiform discharges. A normal EEG does not rule out seizure disorder.     Outside Records:   Outside records from [facility], [facility] were independently reviewed.  Prior vestibular testing reports also reviewed with no evidence of caloric asymmetry.     Assessment and Plan:  Geoff Perera is a 42 year old male who presents for consultation regarding prior diagnosis of right Meniere's disease, current diagnosis of vestibular migraine (under care of [facility]), with intermittent brief episodes of vestibular symptoms particularly exacerbated by stress and neck positions.  Thankfully, at today's visit, he reported that he has been relatively symptom-free for significant period of time and thus this is a time in which he is not necessarily looking to make dramatic changes.  We reviewed his history, course of symptoms, imaging, and audiogram.      Prior symptoms as well as documentation indicate that there was significant fluctuation in right sensorineural hearing and some vestibular symptoms and this would be consistent with right Ménière's disease.  Hearing has been stable for a number of years and remains at a level that could be assisted with amplification at his discretion.  He is previously trialed a hearing aid and may wish to have implications refitted.  Additional loss of word recognition would qualify for CROS technology at his discretion.    We discussed the overlap of migraine and Ménière's.  He has received a diagnosis of vestibular migraine by a neurologist both in [place] and here in the [facility].  He has a family history of migraine and has had headaches stimulated by ingestion of foods likely having MSG in them as well as associated with the other  known triggers, and headache has been associated with nausea.  If vestibular symptoms are associated with these headaches is very reasonable to posit vestibular migraine as a contributor.  He himself brought up the idea of there being a Venn diagram between Ménière's and migraine and indeed we do see significant overlap in the conditions.  Significant improvement in symptoms can be achieved with migraine prevention strategies.  He has trialed more than 1 medication and is currently on Depakote after discontinuing topiramate.  He is well educated on migraine triggers and did not wish to receive additional written material regarding this today.    He has specifically become interested in potential venous etiology for his symptoms.  He has noted that tilting his head in a certain direction which may block off the right jugular vein can potentially lead to additional vestibular symptoms.  He also, however, notes that symptoms are much worse during stressful periods and he is doing better at this time.  We discussed that Dr. Laquita Garcia Cha in neurology here at the [facility] has been investigating venous etiologies for symptoms such as his.  He is or will be enrolled in a muscular training strategy through PT/OT and I think it is very reasonable for him to work on opening up his cervical and shoulder-related musculature.  He is going to discuss venous outflow with them.  I would also concur with his assessment that weight reduction may be helpful.  He may wish to consult with Dr. Jarvis and we provided information regarding this. Physical therapy strategy may be appropriate.  I did express that given his young age and manageability of symptoms, that I would not favor a surgical intervention that is investigational in nature. He will follow up in our clinic as needed.     Scribe Disclosure:  I, Cata Arroyo, am serving as a scribe to document services personally performed by Angelica Cuba MD at this visit,  based upon the provider's statements to me. All documentation has been reviewed by the aforementioned provider prior to being entered into the official medical record.    The documentation recorded by the scribe accurately reflects the services I personally performed and the decisions made by me.     Angelica Cuba MD  Otology & Neurotology  [facility]    I spent a total of 60 minutes face-to-face, reviewing records, or coordinating care of Geoff Perera.

## 2022-05-06 NOTE — PROGRESS NOTES
AUDIOLOGY REPORT    SUMMARY: Audiology visit completed. See audiogram for results.      RECOMMENDATIONS: Follow-up with ENT.    Margareth Mckay M.A.   Audiology Doctoral Student    I was present with the patient for the entire Audiology appointment including all procedures/testing performed by the AuD student, and agree with the student s assessment and plan as documented.      Winifred Gamez, [facility]  Licensed Audiologist  [place] License #[id number]

## 2022-10-03 ENCOUNTER — HEALTH MAINTENANCE LETTER (OUTPATIENT)
Age: 42
End: 2022-10-03

## 2023-05-20 ENCOUNTER — HEALTH MAINTENANCE LETTER (OUTPATIENT)
Age: 43
End: 2023-05-20

## 2023-07-05 NOTE — PATIENT INSTRUCTIONS
Good Communication Strategies    Communication can be challenging for anyone, but can be especially difficult for those with some degree of hearing loss. While we may not be able to control every factor that may lead to difficulty with communication, there are Good Communication Strategies that we can all use in our day-to-day lives. Communication takes both parties working together for it to be successful. Tips as a Listener:   1. Control your environment. It is important to limit the amount of background noise in the room when possible. You should also consider having a good light source in the room to best see the other person. 2. Ask for clarification. Instead of saying \"What?\", you can use parts of what you heard to make a new question. For example, if you heard the word \"Thursday\" but not the rest of the week, you may ask \"What was that about Thursday? \" or \"What did you want to do Thursday? \". This shows the person talking that you are listening and will help them better explain what they are saying. 3. Be an advocate for yourself. If you are hearing but not understanding, tell the other person \"I can hear you, but I need you to slow down when you speak. \"  Or if someone is facing the other direction, say \"I cannot hear you when you are not looking at me when we talk. \"       Tips as a Talker:   - Sit or stand 3 to 6 feet away to maximize audibility         -- It is unrealistic to believe someone else will fully hear your message if you are speaking from across the room or in a different room in the house   - Stay at eye level to help with visual cues   - Make sure you have the persons attention before speaking   - Use facial expressions and gestures to accentuate your message   - Raise your voice slightly (do not scream)   - Speak slowly and distinctly   - Use short, simple sentences   - Rephrase your words if the person is having a hard time understanding you    - To avoid distortion, dont speak directly into a persons ear      Some additional items that may be helpful:   - Remain patient - this is important for both parties   - Write down items that still cannot be heard/understood. You may write with pen/paper or consider typing/texting on a cell phone or smart device. - If background noise is unavoidable, try to keep yourself in a good position in the room. By sitting at a smalls on the side of the restaurant (preferably a corner), it will be easier to communicate than if you were sitting at a table in the middle with background noise surrounding you. Keep yourself positioned away from music speakers or heavy foot traffic. Tinnitus: Overview and Management Strategies          Many people have some ringing sounds in their ears once in a while. You may hear a roar, a hiss, a tinkle, or a buzz. The sound usually lasts only a few minutes. If it goes on all the time, you may have tinnitus. Tinnitus is usually caused by long-term exposure to loud noise. This damages the nerves in the inner ear. It can occur with all types of hearing loss. It may be a symptom of almost any ear problem. Tinnitus may be caused by a buildup of earwax. Or, it may be caused by ear infections or certain medicines (especially antibiotics or large amounts of aspirin). You can also hear noises in your ears because of an injury to the ears, drinking too much alcohol or caffeine, or a medical condition. Other conditions may also contribute to tinnitus, including: head and neck trauma, temporomandibular joint disorder (TMJ), sinus pressure and barometric trauma, traumatic brain injury, metabolic disorders, autoimmune disorders, stress, and high blood pressure. You may need tests to evaluate your hearing and to find causes of long-lasting tinnitus. Your doctor may suggest one or more treatments to help you cope with the tinnitus. You can also do things at home to help reduce symptoms.     Follow-up care is a key part of your treatment and safety. Be sure to make and go to all appointments, and call your doctor if you are having problems. It's also a good idea to know your test results and keep a list of the medicines you take. How can you care for yourself at home? · Limit or cut out alcohol, caffeine, and sodium. They can make your symptoms worse. · Do not smoke or use other tobacco products. Nicotine reduces blood flow to the ear and makes tinnitus worse. If you need help quitting, talk to your doctor about stop-smoking programs and medicines. These can increase your chances of quitting for good. · Talk to your doctor about whether to stop taking aspirin and similar products such as ibuprofen or naproxen. · Get exercise often. It can help improve blood flow to the ear. Ways to manage/cope with tinnitus  Some tinnitus may last a long time. To manage your tinnitus, try to:  · Avoid noises that you think caused your tinnitus. If you can't avoid loud noises, wear earplugs or earmuffs. · Ignore the sound by paying attention to other things. Keeping your brain busy with other tasks or background noise can help your brain not focus on the tinnitus. · Try to not give the tinnitus an emotional reaction. Do your best to ignore the sound and not let it bother you. Relax using biofeedback, meditation, or yoga. Feeling stressed and being tired can make tinnitus worse. · Play music or white noise to help you sleep. Background noise may cover up the noise that you hear in your ears. You can buy a tabletop machine or a device that sits under your pillow to play soothing sounds, like ocean waves. · Smart phones have free apps, such as Whist, Relax Melodies, ReSound Relief, and White Noise Lite. These apps have different types of sounds/noise, some of which you can blend together to find sounds that are most soothing to you.   · Hearing aid technology, especially when there is some hearing loss, may help reduce tinnitus which is the feeling that you are about to faint. It can be hard to know what causes dizziness. Some people feel dizzy when they have migraine headaches. Sometimes bouts of flu can make you feel dizzy. Some medical conditions, such as heart problems or high blood pressure, can make you feel dizzy. Many medicines can cause dizziness, including medicines for high blood pressure, pain, or anxiety. If a medicine causes your symptoms, your doctor may recommend that you stop or change the medicine. If it is a problem with your heart, you may need medicine to help your heart work better. If there is no clear reason for your symptoms, your doctor may suggest watching and waiting for a while to see if the dizziness goes away on its own. Follow-up care is a key part of your treatment and safety. Be sure to make and go to all appointments, and call your doctor if you are having problems. It's also a good idea to know your test results and keep a list of the medicines you take. How can you care for yourself at home? · If your doctor recommends or prescribes medicine, take it exactly as directed. Call your doctor if you think you are having a problem with your medicine. · Do not drive while you feel dizzy. · Try to prevent falls. Steps you can take include:  ? Using nonskid mats, adding grab bars near the tub, and using night-lights. ? Clearing your home so that walkways are free of anything you might trip on.  ? Letting family and friends know that you have been feeling dizzy. This will help them know how to help you. When should you call for help? Call 911 anytime you think you may need emergency care. For example, call if:    · You passed out (lost consciousness). · You have dizziness along with symptoms of a heart attack. These may include:  ? Chest pain or pressure, or a strange feeling in the chest.  ? Sweating. ? Shortness of breath. ? Nausea or vomiting.   ? Pain, pressure, or a strange feeling in the back, neck, jaw, or upper belly or in one or both shoulders or arms. ? Lightheadedness or sudden weakness. ? A fast or irregular heartbeat. · You have symptoms of a stroke. These may include:  ? Sudden numbness, tingling, weakness, or loss of movement in your face, arm, or leg, especially on only one side of your body. ? Sudden vision changes. ? Sudden trouble speaking. ? Sudden confusion or trouble understanding simple statements. ? Sudden problems with walking or balance. ? A sudden, severe headache that is different from past headaches. Call your doctor now or seek immediate medical care if:    · You feel dizzy and have a fever, headache, or ringing in your ears. · You have new or increased nausea and vomiting. · Your dizziness does not go away or comes back. Watch closely for changes in your health, and be sure to contact your doctor if:    · You do not get better as expected. Where can you learn more? Go to https://[employer]. org and sign in to your [employer] account. Enter N179 in the [employer] box to learn more about \"Dizziness: Care Instructions. \"     If you do not have an account, please click on the \"Sign Up Now\" link. Current as of: September 23, 2018  Content Version: 11.9  © 8659-4667 [employer]. Care instructions adapted under license by [facility] ([facility]). If you have questions about a medical condition or this instruction, always ask your healthcare professional. [place] [address] any warranty or liability for your use of this information. Hearing Loss: Care Instructions  Your Care Instructions      Hearing loss is a sudden or slow decrease in how well you hear. It can range from mild to profound. Permanent hearing loss can occur with aging, and it can happen when you are exposed long-term to loud noise.  Examples include listening to loud music, riding motorcycles, or being around other loud machines. Hearing loss can affect your work and home life. It can make you feel lonely or depressed. You may feel that you have lost your independence. But hearing aids and other devices can help you hear better and feel connected to others. Follow-up care is a key part of your treatment and safety. Be sure to make and go to all appointments, and call your doctor if you are having problems. It's also a good idea to know your test results and keep a list of the medicines you take. How can you care for yourself at home? · Avoid loud noises whenever possible. This helps keep your hearing from getting worse. Always wear hearing protection around loud noises. · If appropriate, wear hearing aid(s) as directed. It is recommended that hearing aids are worn during all waking hours to keep your brain active and give it access to the sounds it is missing. · If you are beginning your process with hearing aid(s), schedule a \"Hearing Aid Evaluation\" with an audiologist to discuss your lifestyle, features of hearing aid technology, and styles of hearing aids available. It is recommended that you contact your insurance company to determine if you have a hearing aid benefit, as this may dictate who you can see for these services. · Have hearing tests as your doctor suggests. They can show whether your hearing has changed. Your hearing aid may need to be adjusted. · Use other assistive devices as needed. These may include:  ? Telephone amplifiers and hearing aids that can connect to a television, stereo, radio, or microphone. ? Devices that use lights or vibrations. These alert you to the doorbell, a ringing telephone, or a baby monitor. ? Television closed-captioning. This shows the words at the bottom of the screen. Most new TVs can do this. ? TTY (text telephone). This lets you type messages back and forth on the telephone instead of talking or listening. These devices are also called TDD.  When messages are typed on the keyboard, they are sent over the phone line to a receiving TTY. The message is shown on a monitor. · Use pagers, fax machines, text, and email if it is hard for you to communicate by telephone. · Try to learn a listening technique called speech-reading. It is not lip-reading. You pay attention to people's gestures, expressions, posture, and tone of voice. These clues can help you understand what a person is saying. Face the person you are talking to, and have him or her face you. Make sure the lighting is good. You need to see the other person's face clearly. · Think about counseling if you need help to adjust to your hearing loss. When should you call for help? Watch closely for changes in your health, and be sure to contact your doctor if:    · You think your hearing is getting worse. · You have new symptoms, such as dizziness or nausea. Alar Island Pedicle Flap Text: The defect edges were debeveled with a #15 scalpel blade. Given the location of the defect, shape of the defect and the proximity to the alar rim an island pedicle advancement flap was deemed most appropriate. Using a sterile surgical marker, an appropriate advancement flap was drawn incorporating the defect, outlining the appropriate donor tissue and placing the expected incisions within the nasal ala running parallel to the alar rim. The area thus outlined was incised with a #15 scalpel blade. The skin margins were undermined minimally to an appropriate distance in all directions around the primary defect and laterally outward around the island pedicle utilizing iris scissors.  There was minimal undermining beneath the pedicle flap. Following this, the designed flap was carried over into the primary defect and sutured into place.

## 2024-07-28 ENCOUNTER — HEALTH MAINTENANCE LETTER (OUTPATIENT)
Age: 44
End: 2024-07-28

## (undated) DEVICE — SUTURE VCRL + SZ 0 L27IN ABSRB VLT L26MM UR-6 5/8 CIR VCP603H

## (undated) DEVICE — SYSTEM SMK EVAC LAP TBNG FILTER HSNG BENT STYL PNK SEE CLR

## (undated) DEVICE — SOLUTION IRRG STRL H2O 500 ML BTL 16/CA

## (undated) DEVICE — Device

## (undated) DEVICE — GOWN,REINF,POLY,AURORA,XLNG/XXL,STRL: Brand: MEDLINE

## (undated) DEVICE — REDUCER: Brand: ENDOWRIST

## (undated) DEVICE — SUTURE ABSORBABLE MONOFILAMENT 0 CT-2 12 IN STRATAFIX SPRL SXPP1B405

## (undated) DEVICE — TIP COVER ACCESSORY

## (undated) DEVICE — SUTURE VCRL SZ 2-0 L27IN ABSRB VLT L26MM SH 1/2 CIR J317H

## (undated) DEVICE — SCISSORS SURG DIA8MM MPLR CRV ENDOWRIST

## (undated) DEVICE — SEAL

## (undated) DEVICE — CANNULA SEAL

## (undated) DEVICE — CATHETER IV 20 GAX25 IN STR INTROCAN SAFETY

## (undated) DEVICE — TROCAR: Brand: KII FIOS FIRST ENTRY

## (undated) DEVICE — SOLUTION IRRIG 2000ML STRL H2O UROMATIC PLAS CONT USP

## (undated) DEVICE — SUTURE VCRL + SZ 3-0 L18IN ABSRB UD SH 1/2 CIR TAPERCUT NDL VCP864D

## (undated) DEVICE — SUTURE STRATAFIX SPRL MCRYL + SZ 3 0 L8IN ABSRB UD L26MM SH SXMP1B427

## (undated) DEVICE — MEGA SUTURECUT ND: Brand: ENDOWRIST

## (undated) DEVICE — SYRINGE MED 30ML STD CLR PLAS LUERLOCK TIP N CTRL DISP

## (undated) DEVICE — ARM DRAPE

## (undated) DEVICE — BLADELESS OBTURATOR: Brand: WECK VISTA

## (undated) DEVICE — SUTURE MCRYL + SZ 4-0 L18IN ABSRB UD L19MM PS-2 3/8 CIR MCP496G

## (undated) DEVICE — [HIGH FLOW INSUFFLATOR,  DO NOT USE IF PACKAGE IS DAMAGED,  KEEP DRY,  KEEP AWAY FROM SUNLIGHT,  PROTECT FROM HEAT AND RADIOACTIVE SOURCES.]: Brand: PNEUMOSURE

## (undated) DEVICE — FENESTRATED BIPOLAR FORCEPS: Brand: ENDOWRIST

## (undated) DEVICE — STANDARD HYPODERMIC NEEDLE,POLYPROPYLENE HUB: Brand: MONOJECT

## (undated) DEVICE — COLUMN DRAPE